# Patient Record
Sex: FEMALE | Race: WHITE | HISPANIC OR LATINO | URBAN - METROPOLITAN AREA
[De-identification: names, ages, dates, MRNs, and addresses within clinical notes are randomized per-mention and may not be internally consistent; named-entity substitution may affect disease eponyms.]

---

## 2022-12-17 ENCOUNTER — EMERGENCY (EMERGENCY)
Facility: HOSPITAL | Age: 62
LOS: 1 days | Discharge: HOME | End: 2022-12-17
Admitting: EMERGENCY MEDICINE

## 2022-12-17 VITALS
OXYGEN SATURATION: 96 % | RESPIRATION RATE: 16 BRPM | HEART RATE: 76 BPM | DIASTOLIC BLOOD PRESSURE: 78 MMHG | TEMPERATURE: 98 F | SYSTOLIC BLOOD PRESSURE: 131 MMHG

## 2022-12-17 VITALS
DIASTOLIC BLOOD PRESSURE: 73 MMHG | RESPIRATION RATE: 16 BRPM | SYSTOLIC BLOOD PRESSURE: 113 MMHG | HEIGHT: 65 IN | TEMPERATURE: 97 F | OXYGEN SATURATION: 97 % | HEART RATE: 81 BPM | WEIGHT: 130.07 LBS

## 2022-12-17 DIAGNOSIS — Z87.19 PERSONAL HISTORY OF OTHER DISEASES OF THE DIGESTIVE SYSTEM: ICD-10-CM

## 2022-12-17 DIAGNOSIS — R10.13 EPIGASTRIC PAIN: ICD-10-CM

## 2022-12-17 DIAGNOSIS — K80.20 CALCULUS OF GALLBLADDER WITHOUT CHOLECYSTITIS WITHOUT OBSTRUCTION: ICD-10-CM

## 2022-12-17 DIAGNOSIS — K29.70 GASTRITIS, UNSPECIFIED, WITHOUT BLEEDING: ICD-10-CM

## 2022-12-17 LAB
ALBUMIN SERPL ELPH-MCNC: 3.8 G/DL — SIGNIFICANT CHANGE UP (ref 3.4–5)
ALP SERPL-CCNC: 101 U/L — SIGNIFICANT CHANGE UP (ref 40–120)
ALT FLD-CCNC: 61 U/L — HIGH (ref 12–42)
ANION GAP SERPL CALC-SCNC: 6 MMOL/L — LOW (ref 9–16)
APPEARANCE UR: CLEAR — SIGNIFICANT CHANGE UP
AST SERPL-CCNC: 61 U/L — HIGH (ref 15–37)
BACTERIA # UR AUTO: PRESENT /HPF
BASOPHILS # BLD AUTO: 0.06 K/UL — SIGNIFICANT CHANGE UP (ref 0–0.2)
BASOPHILS NFR BLD AUTO: 0.5 % — SIGNIFICANT CHANGE UP (ref 0–2)
BILIRUB SERPL-MCNC: 1.3 MG/DL — HIGH (ref 0.2–1.2)
BILIRUB UR-MCNC: NEGATIVE — SIGNIFICANT CHANGE UP
BUN SERPL-MCNC: 17 MG/DL — SIGNIFICANT CHANGE UP (ref 7–23)
CALCIUM SERPL-MCNC: 9.5 MG/DL — SIGNIFICANT CHANGE UP (ref 8.5–10.5)
CHLORIDE SERPL-SCNC: 105 MMOL/L — SIGNIFICANT CHANGE UP (ref 96–108)
CO2 SERPL-SCNC: 30 MMOL/L — SIGNIFICANT CHANGE UP (ref 22–31)
COLOR SPEC: YELLOW — SIGNIFICANT CHANGE UP
CREAT SERPL-MCNC: 0.66 MG/DL — SIGNIFICANT CHANGE UP (ref 0.5–1.3)
DIFF PNL FLD: ABNORMAL
EGFR: 100 ML/MIN/1.73M2 — SIGNIFICANT CHANGE UP
EOSINOPHIL # BLD AUTO: 0.1 K/UL — SIGNIFICANT CHANGE UP (ref 0–0.5)
EOSINOPHIL NFR BLD AUTO: 0.8 % — SIGNIFICANT CHANGE UP (ref 0–6)
EPI CELLS # UR: ABNORMAL /HPF (ref 0–5)
GLUCOSE SERPL-MCNC: 140 MG/DL — HIGH (ref 70–99)
GLUCOSE UR QL: NEGATIVE — SIGNIFICANT CHANGE UP
GRAN CASTS # UR COMP ASSIST: ABNORMAL /LPF
HCT VFR BLD CALC: 40.1 % — SIGNIFICANT CHANGE UP (ref 34.5–45)
HGB BLD-MCNC: 13.4 G/DL — SIGNIFICANT CHANGE UP (ref 11.5–15.5)
IMM GRANULOCYTES NFR BLD AUTO: 0.3 % — SIGNIFICANT CHANGE UP (ref 0–0.9)
KETONES UR-MCNC: NEGATIVE — SIGNIFICANT CHANGE UP
LEUKOCYTE ESTERASE UR-ACNC: NEGATIVE — SIGNIFICANT CHANGE UP
LIDOCAIN IGE QN: 119 U/L — SIGNIFICANT CHANGE UP (ref 73–393)
LYMPHOCYTES # BLD AUTO: 17.8 % — SIGNIFICANT CHANGE UP (ref 13–44)
LYMPHOCYTES # BLD AUTO: 2.1 K/UL — SIGNIFICANT CHANGE UP (ref 1–3.3)
MAGNESIUM SERPL-MCNC: 2.3 MG/DL — SIGNIFICANT CHANGE UP (ref 1.6–2.6)
MCHC RBC-ENTMCNC: 31.7 PG — SIGNIFICANT CHANGE UP (ref 27–34)
MCHC RBC-ENTMCNC: 33.4 GM/DL — SIGNIFICANT CHANGE UP (ref 32–36)
MCV RBC AUTO: 94.8 FL — SIGNIFICANT CHANGE UP (ref 80–100)
MONOCYTES # BLD AUTO: 0.46 K/UL — SIGNIFICANT CHANGE UP (ref 0–0.9)
MONOCYTES NFR BLD AUTO: 3.9 % — SIGNIFICANT CHANGE UP (ref 2–14)
NEUTROPHILS # BLD AUTO: 9.05 K/UL — HIGH (ref 1.8–7.4)
NEUTROPHILS NFR BLD AUTO: 76.7 % — SIGNIFICANT CHANGE UP (ref 43–77)
NITRITE UR-MCNC: NEGATIVE — SIGNIFICANT CHANGE UP
NRBC # BLD: 0 /100 WBCS — SIGNIFICANT CHANGE UP (ref 0–0)
PH UR: 6 — SIGNIFICANT CHANGE UP (ref 5–8)
PLATELET # BLD AUTO: 279 K/UL — SIGNIFICANT CHANGE UP (ref 150–400)
POTASSIUM SERPL-MCNC: 4.2 MMOL/L — SIGNIFICANT CHANGE UP (ref 3.5–5.3)
POTASSIUM SERPL-SCNC: 4.2 MMOL/L — SIGNIFICANT CHANGE UP (ref 3.5–5.3)
PROT SERPL-MCNC: 7.7 G/DL — SIGNIFICANT CHANGE UP (ref 6.4–8.2)
PROT UR-MCNC: 30 MG/DL
RBC # BLD: 4.23 M/UL — SIGNIFICANT CHANGE UP (ref 3.8–5.2)
RBC # FLD: 11.9 % — SIGNIFICANT CHANGE UP (ref 10.3–14.5)
RBC CASTS # UR COMP ASSIST: ABNORMAL /HPF
SODIUM SERPL-SCNC: 141 MMOL/L — SIGNIFICANT CHANGE UP (ref 132–145)
SP GR SPEC: 1.02 — SIGNIFICANT CHANGE UP (ref 1–1.03)
TROPONIN I, HIGH SENSITIVITY RESULT: 4.7 NG/L — SIGNIFICANT CHANGE UP
UROBILINOGEN FLD QL: 0.2 E.U./DL — SIGNIFICANT CHANGE UP
WBC # BLD: 11.81 K/UL — HIGH (ref 3.8–10.5)
WBC # FLD AUTO: 11.81 K/UL — HIGH (ref 3.8–10.5)
WBC UR QL: < 5 /HPF — SIGNIFICANT CHANGE UP

## 2022-12-17 PROCEDURE — 99284 EMERGENCY DEPT VISIT MOD MDM: CPT

## 2022-12-17 PROCEDURE — 76705 ECHO EXAM OF ABDOMEN: CPT | Mod: 26

## 2022-12-17 PROCEDURE — 71046 X-RAY EXAM CHEST 2 VIEWS: CPT | Mod: 26

## 2022-12-17 RX ORDER — FAMOTIDINE 10 MG/ML
20 INJECTION INTRAVENOUS ONCE
Refills: 0 | Status: COMPLETED | OUTPATIENT
Start: 2022-12-17 | End: 2022-12-17

## 2022-12-17 RX ORDER — FAMOTIDINE 10 MG/ML
1 INJECTION INTRAVENOUS
Qty: 60 | Refills: 0
Start: 2022-12-17 | End: 2023-01-15

## 2022-12-17 RX ORDER — ONDANSETRON 8 MG/1
4 TABLET, FILM COATED ORAL ONCE
Refills: 0 | Status: COMPLETED | OUTPATIENT
Start: 2022-12-17 | End: 2022-12-17

## 2022-12-17 RX ADMIN — FAMOTIDINE 20 MILLIGRAM(S): 10 INJECTION INTRAVENOUS at 11:02

## 2022-12-17 RX ADMIN — Medication 30 MILLILITER(S): at 11:02

## 2022-12-17 RX ADMIN — ONDANSETRON 4 MILLIGRAM(S): 8 TABLET, FILM COATED ORAL at 11:02

## 2022-12-17 NOTE — ED PROVIDER NOTE - OBJECTIVE STATEMENT
61 yo F, denies pmhx, presenting to the ED w/ epigastric abd pain that began suddenly while working today. +nausea w/o vomiting. Non radiating and no associated chest pain.    [id number] 61 yo F, h/o gastritis, presenting to the ED w/ epigastric abd pain that began suddenly while working today. +nausea w/o vomiting. Non radiating and no associated chest pain. Pt had coffee and a fried egg this AM. She reports similar pain in the past 2/2 gastritis. Denies fevers, chills, headaches, dizziness, chest pain, SOB, back pain, vomiting, diarrhea, or urinary sxs.  PI: [id number]

## 2022-12-17 NOTE — ED PROVIDER NOTE - PROGRESS NOTE DETAILS
Unable to visualize gallbladder during bedside US. Mild transaminitis on labs, will obtain official US. Patient reports resolution of symptoms. Likely gastritis. Official US shows gallstones w/o wall inflammation. WIll refer to surgery. RX for pepcid sent. Pt stable on DC.

## 2022-12-17 NOTE — ED PROVIDER NOTE - PHYSICAL EXAMINATION
VITAL SIGNS: I have reviewed nursing notes and confirm.  CONSTITUTIONAL: Well-developed; well-nourished; in no acute distress.  SKIN: Skin is warm and dry, no acute rash.  HEAD: Normocephalic; atraumatic.  NECK: Supple; non tender.  CARD: S1, S2 normal; no murmurs, gallops, or rubs. Regular rate and rhythm.  RESP: No wheezes, rales or rhonchi.  ABD: Soft; non-distended; +ttp along the epigastric region; no rebound or guarding.  EXT: Normal ROM. No clubbing, cyanosis or edema.  NEURO: Alert, oriented. Grossly unremarkable. BOLANOS, normal tone, no gross motor or sensory changes. Fluent speech.   PSYCH: Cooperative, appropriate. Mood and affect wnl.

## 2022-12-17 NOTE — ED PROVIDER NOTE - CARE PROVIDER_API CALL
Adriana Corea)  Surgery  [address], [place] [address]  Phone: [phone]  Fax: [phone]  Follow Up Time: 1-3 Days

## 2022-12-17 NOTE — ED PROVIDER NOTE - PATIENT PORTAL LINK FT
You can access the FollowMyHealth Patient Portal offered by [facility] by registering at the following website: [email or web address]. By joining [employer]’s FollowMyHealth portal, you will also be able to view your health information using other applications (apps) compatible with our system.

## 2022-12-17 NOTE — ED PROVIDER NOTE - CARE PROVIDERS DIRECT ADDRESSES
[email or web address]

## 2022-12-17 NOTE — ED PROVIDER NOTE - NS ED ROS FT
+nausea and abdominal pain  Denies fevers, chills, vomiting, diarrhea, constipation, urinary symptoms, chest pain, palpitations, shortness of breath, dyspnea on exertion, syncope/near syncope, cough/URI symptoms, headache, weakness, numbness, focal deficits, visual changes, gait or balance changes, dizziness

## 2022-12-17 NOTE — ED PROVIDER NOTE - CONDITION AT DISCHARGE:
Satisfactory

## 2022-12-17 NOTE — ED PROVIDER NOTE - CLINICAL SUMMARY MEDICAL DECISION MAKING FREE TEXT BOX
63 yo F, h/o gastritis, presenting to the ED w/ epigastric abd pain that began suddenly while working today. Patient found to have epigastric ttp on exam. Will plan to obtain EKG, CXR, and medical labs. Will give GI cocktail, PO challenge and reassess. Likely GI outpt f/u. Dispo pending clinical improvement. 63 yo F, h/o gastritis, presenting to the ED w/ epigastric abd pain that began suddenly while working today. Patient found to have epigastric ttp on exam. Bedside US does not show obvious signs of stones. Will plan to obtain EKG, CXR, and medical labs. Will give GI cocktail, PO challenge and reassess. Likely GI outpt f/u. Dispo pending clinical improvement. 61 yo F, h/o gastritis, presenting to the ED w/ epigastric abd pain that began suddenly while working today. Patient found to have epigastric ttp on exam. Will plan to obtain EKG, CXR, and medical labs. Will give GI cocktail, PO challenge and reassess. Likely GI outpt f/u. Dispo pending clinical improvement.

## 2022-12-17 NOTE — ED PROVIDER NOTE - NSFOLLOWUPINSTRUCTIONS_ED_ALL_ED_FT
Gastritis    Gastritis is soreness and swelling (inflammation) of the lining of the stomach. Gastritis can develop as a sudden onset (acute) or long-term (chronic) condition. If gastritis is not treated, it can lead to stomach bleeding and ulcers. Causes include viral and bacterial infections, excessive alcohol consumption, tobacco use, or certain medications. Symptoms include nausea, vomiting, or abdominal pain or burning especially after eating. Avoid foods or drinks that make your symptoms worse such as caffeine, chocolate, spicy foods, acidic foods, or alcohol.    SEEK IMMEDIATE MEDICAL CARE IF YOU HAVE ANY OF THE FOLLOWING SYMPTOMS: black or bloody stools, blood or coffee-ground-colored vomitus, worsening abdominal pain, fever, or inability to keep fluids down.        Colelitiasis    Cholelithiasis       La colelitiasis es wendy enfermedad en la que se neda cálculos biliares en la vesícula biliar. La vesícula biliar es un órgano que almacena bilis. La bilis es un líquido que interviene en la digestión de las grasas. Los cálculos comienzan trino pequeños jacqui y lentamente pueden transformarse en piedras. Es posible que no causen síntomas hasta que obstruyen el conducto de la vesícula biliar o el conducto quístico, cuando la vesícula biliar se tensa (contrae) después de comer alimentos. [place] puede causar dolor y se conoce trino ataque de vesícula biliar o cólico biliar.    Hay dos tipos principales de cálculos biliares:  •Cálculos de colesterol. Estos son el tipo de cálculo biliar más frecuente. Estos cálculos se neda por el colesterol endurecido y generalmente son de color amarillo verdoso. El colesterol es wendy sustancia parecida a la grasa que se forma en el hígado.      •Cálculos de pigmento. Estos son de color oscuro y están formados por wendy sustancia amarillo rojiza, llamada bilirrubina,que se forma cuando la hemoglobina de los glóbulos rojos se descompone.        ¿Cuáles son las causas?    La causa de esta afección puede ser un desequilibrio en las diferentes partes que producen la bilis. [place] puede suceder si la bilis:  •Tiene mucha bilirrubina. [place] puede suceder en ciertas enfermedades de la lissy, trino la anemia drepanocítica.      •Tiene mucho colesterol.      •No tiene suficiente sales biliares. Estas sales le ayudan al organismo a absorber y a digerir las grasas.      En ciertos casos, esta afección también puede ser causada por wendy vesícula biliar que no se vacía completamente o lo suficientemente seguido. [place] es frecuente acacia el embarazo.      ¿Qué incrementa el riesgo?    Los siguientes factores pueden hacer que sea más propenso a contraer esta afección:  •Ser leelee.      •Tener embarazos múltiples. Algunas veces los médicos aconsejan extirpar los cálculos biliares antes de futuros embarazos.      •Tener wendy dieta con demasiadas comidas fritas, grasas y carbohidratos refinados, trino el pan avila y el arroz avila.      •Ser mariya.      •Ser mayor de 40 años de edad.      •Usar medicamentos que contienen hormonas femeninas (estrógenos) acacia un pollo tiempo.      •Perder peso con rapidez.      •Antecedentes familiares de cálculos biliares.    •Tener ciertos problemas médicos, por ejemplo:  •Diabetes mellitus.      •Fibrosis quística.      •Enfermedad de Crohn.      •Cirrosis u otra enfermedad hepática a pollo plazo (crónica).      •Determinadas enfermedades de la lissy, trino anemia drepanocítica o leucemia.          ¿Cuáles son los signos o síntomas?    En muchos casos, tener cálculos biliares no causa síntomas. Si tiene cálculos biliares kelsey no tiene síntomas, tiene cálculos silenciosos. Si un cálculo biliar bloquea el conducto biliar, puede causar un ataque de vesícula biliar. El principal síntoma de un ataque de vesícula biliar es un dolor repentino en la parte superior derecha del abdomen. El dolor:  •Aparece generalmente a la noche o después de comer comidas abundantes.       •Puede durar wendy hora o más.      •Se puede extender hacia el hombro derecho, la espalda o el pecho.      •Puede sentirse trino indigestión. [place] es wendy molestia, ardor o sensación de plenitud en la parte superior del abdomen.      Si el conducto biliar está bloqueado acacia más de algunas horas, puede causar infección o inflamación de la vesícula biliar (colecistitis), del hígado o del páncreas. [place] puede causar lo siguiente:  •Náuseas o vómitos.      •Distensión.      •Dolor abdominal que dura 5 horas o más.      •Dolor con la palpación en la parte superior del abdomen, a menudo en la sección superior derecha y debajo de la caja torácica.      •Fiebre o escalofríos.      •La piel o las partes harvey de los ojos se ponen andi (ictericia). En general esto ocurre cuando wendy juanjose ha obstruido el paso de la bilis a través del conducto biliar común.      •Orina de color oscuro o heces de color pálido.        ¿Cómo se diagnostica?    Esta afección se puede diagnosticar en función de lo siguiente:  •Un examen físico.      •Anastacia antecedentes médicos.      •Wendy ecografía.      •Exploración por tomografía computarizada (TC).      •Resonancia magnética (RM).      También pueden hacerle otras pruebas, incluidas las siguientes:  •Análisis de lissy para detectar signos de infección o inflamación.      •Wendy colecistografía o gammagrafía hepatobiliar HIDA. Francoise es un estudio de la vesícula biliar y los conductos biliares (sistema biliar) que utiliza material radiactivo no dañino y cámaras especiales que pueden rolo el material radiactivo.      •Colangiopancreatografía retrógrada endoscópica. Francoise estudio consiste en insertar un pequeño tubo con wendy cámara en el extremo (endoscopio) a través de la boca para observar los conductos biliares y detectar obstrucciones.        ¿Cómo se trata?    El tratamiento de esta afección depende de la gravedad. Los cálculos silenciosos no requieren tratamiento. Si wendy obstrucción causa un ataque de vesícula biliar u otros síntomas, puede ser necesario recibir tratamiento. El tratamiento puede incluir:•Cuidados en el hogar, si los síntomas no son graves.  •Acacia un ataque simple de vesícula biliar, deje de comer y beber acacia 12 a 24 horas (excepto agua y líquidos transparentes). [place] ayuda a “enfriar” la vesícula biliar. Después de 1 o 2 días, puede comenzar a consumir alimentos simples o transparentes, trino caldos y galletas.      •También es posible que necesite medicamentos para el dolor o las náuseas, o para ambos.       •Si tiene colecistitis y wendy infección, necesitará antibióticos.        •Hospitalización, si es necesaria para controlar del dolor o en dante de colecistitis con infección grave.      •Wendy colecistectomía, o cirugía para extirpar la vesícula biliar. Francoise es el tratamiento más frecuente si todos los demás tratamientos no saravia funcionado.      •Medicamentos para destruir los cálculos biliares. Estos son más efectivos para tratar cálculos pequeños. Los medicamentos pueden usarse acacia hasta 6 a 12 meses.      •Colangiopancreatografía retrógrada endoscópica. Se puede anexar wendy pequeña cesta al endoscopio y usarse para capturar y eliminar los cálculos, especialmente los que se encuentran en el conducto biliar común.        Siga estas instrucciones en mcgrath casa:    Medicamentos     •Use los medicamentos de venta carisa y los recetados solamente trino se lo haya indicado el médico.      •Si le recetaron un antibiótico, tómelo trino se lo haya indicado el médico. No deje de emmanuel el antibiótico aunque comience a sentirse mejor.      •Pregúntele al médico si el medicamento recetado le impide conducir o usar maquinaria.      Comida y bebida     •Karen suficiente líquido trino para mantener la orina de color amarillo pálido. [place] es importante acacia un ataque de vesícula biliar. Es preferible emmanuel agua y líquidos abiola.    •Siga wendy dieta saludable. [place] puede comprender lo siguiente:  •Reducir los alimentos grasos, trino los alimentos fritos y los alimentos con alto contenido de colesterol.      •Reducir los carbohidratos refinados, trino el pan avila y el arroz avila.      •Consumir más fibra. Alimentarse con alimentos trino almendras, frutas y frijoles.

## 2022-12-19 PROBLEM — Z00.00 ENCOUNTER FOR PREVENTIVE HEALTH EXAMINATION: Status: ACTIVE | Noted: 2022-12-19

## 2022-12-19 PROBLEM — K29.70 GASTRITIS, UNSPECIFIED, WITHOUT BLEEDING: Chronic | Status: ACTIVE | Noted: 2022-12-17

## 2022-12-27 ENCOUNTER — APPOINTMENT (OUTPATIENT)
Dept: SURGERY | Facility: CLINIC | Age: 62
End: 2022-12-27

## 2022-12-27 VITALS
SYSTOLIC BLOOD PRESSURE: 121 MMHG | WEIGHT: 143 LBS | OXYGEN SATURATION: 98 % | BODY MASS INDEX: 28.83 KG/M2 | HEIGHT: 59 IN | DIASTOLIC BLOOD PRESSURE: 75 MMHG | HEART RATE: 75 BPM | TEMPERATURE: 98.2 F | RESPIRATION RATE: 16 BRPM

## 2022-12-27 DIAGNOSIS — Z87.19 PERSONAL HISTORY OF OTHER DISEASES OF THE DIGESTIVE SYSTEM: ICD-10-CM

## 2022-12-27 DIAGNOSIS — Z78.9 OTHER SPECIFIED HEALTH STATUS: ICD-10-CM

## 2022-12-27 PROCEDURE — 99244 OFF/OP CNSLTJ NEW/EST MOD 40: CPT

## 2022-12-27 RX ORDER — FAMOTIDINE 20 MG/1
20 TABLET, FILM COATED ORAL
Refills: 0 | Status: ACTIVE | COMMUNITY

## 2022-12-27 NOTE — REASON FOR VISIT
[Consultation] : a consultation visit [FreeTextEntry1] : Consultation requested by: Dr. Quintin Rodney [Interpreters_IDNumber] : [id number] [Interpreters_FullName] : JESSICA [TWNoteComboBox1] : Tongan

## 2022-12-27 NOTE — PHYSICAL EXAM
[Calm] : calm [de-identified] : NAD, comfortable [de-identified] : NCAT, no scleral icterus [de-identified] : +BS soft ND.  Mild epigastric tenderness without rebound or guarding.  Negative Diaz's sign.  No hepatosplenomegaly. [de-identified] : No clubbing, cyanosis, or edema.  [de-identified] : Warm, dry.  [de-identified] : A&Ox3

## 2022-12-27 NOTE — ASSESSMENT
[FreeTextEntry1] : Ms. Jonathan Salgado is a 62-year-old woman with epigastric pain and cholelithiasis.  It is unclear whether her symptoms are related to her known history of gastritis (or some other peptic ulcer disease issue) or the cholelithiasis.  She was given a referral for an endoscopy to evaluate the stomach prior to cholecystectomy.

## 2022-12-27 NOTE — DATA REVIEWED
[FreeTextEntry1] : US abdomen (12/17/2022) - normal caliber common bile duct measures 0.5 cm.  Numerous gallstones. No evidence of cholecystitis.  Hepatic steatosis. None

## 2022-12-27 NOTE — CONSULT LETTER
[FreeTextEntry1] : 2022\par \par \par \par \par Quintin Rodney M.D.\par [address] [facility]\[place] [address]\par Telephone #: [phone]\par \par \par Re: Jonathan Salgado, Leifitiva\par : 1960\par \par \par Dear Dr. Rodney:\par \par I had the opportunity to see Ms. Jonathan Salgado today for evaluation and management of epigastric pain.  She had sudden onset of the pain 10 days ago, associated with nausea and sweating.  She denied associated vomiting, fevers, or chills.  She was taken by ambulance to the emergency department on 2022, and diagnosed with gallstones.  She stated the episode of pain did not have any association with food intake.  Since discharge from the emergency department, she has not had any further episodes of pain.  She stated five years ago, she had similar pain and was told to change her diet to avoid spicy foods, with subsequent resolution of the pain.\par \par On physical examination, her height is 4 feet 11 inches, weight is 143 pounds, and BMI 28.88. Her temperature is 98.2 °F, blood pressure is 121/75, heart rate is 75, and O2 saturation is 98% on room air. In general, she is a well-dressed, well-nourished woman who appears her stated age and is in no acute distress. She is calm, alert and oriented x 3. HEENT exam demonstrates a normocephalic atraumatic appearance with no scleral icterus. Her abdomen is soft and non-distended.  There is mild epigastric tenderness.  Diaz's sign is negative.  Her extremities are warm and dry without clubbing, cyanosis or edema. \par \par I reviewed the report of the ultrasound of the abdomen that was performed on 2022, which demonstrated normal caliber common bile duct measures 0.5 cm.  Numerous gallstones. No evidence of cholecystitis.  Hepatic steatosis.\par \par In summary, Ms. Jonathan Salgado is a 62-year-old woman with epigastric pain and cholelithiasis.  It is unclear whether her symptoms are related to her known history of gastritis (or some other peptic ulcer disease issue) or the cholelithiasis.  She was given a referral for an endoscopy to evaluate the stomach prior to cholecystectomy.\par \par Thank you for the opportunity to care for this patient. Please do not hesitate to contact me in the event that you have any questions or concerns regarding the care of this patient. \par \par Sincerely,\par \par \par \par \par Adriana Corea M.D.

## 2022-12-27 NOTE — HISTORY OF PRESENT ILLNESS
[de-identified] : Ms. Jonathan Salgado presented today for evaluation and management of epigastric pain.  She had sudden onset of the pain 10 days ago, associated with nausea and sweating.  She denied associated vomiting, fevers, or chills.  She was taken by ambulance to the emergency department on December 17, 2022, and diagnosed with gallstones.  She stated the episode of pain did not have any association with food intake.  Since discharge from the emergency department, she has not had any further episodes of pain.  She stated five years ago, she had similar pain and was told to change her diet to avoid spicy foods, with subsequent resolution of the pain.

## 2023-01-27 ENCOUNTER — APPOINTMENT (OUTPATIENT)
Dept: GASTROENTEROLOGY | Facility: CLINIC | Age: 63
End: 2023-01-27
Payer: MEDICAID

## 2023-01-27 VITALS
OXYGEN SATURATION: 98 % | TEMPERATURE: 97.4 F | SYSTOLIC BLOOD PRESSURE: 125 MMHG | DIASTOLIC BLOOD PRESSURE: 60 MMHG | HEART RATE: 87 BPM | WEIGHT: 144 LBS | HEIGHT: 59 IN | RESPIRATION RATE: 15 BRPM | BODY MASS INDEX: 29.03 KG/M2

## 2023-01-27 DIAGNOSIS — Z01.812 ENCOUNTER FOR PREPROCEDURAL LABORATORY EXAMINATION: ICD-10-CM

## 2023-01-27 DIAGNOSIS — E78.2 MIXED HYPERLIPIDEMIA: ICD-10-CM

## 2023-01-27 DIAGNOSIS — R12 HEARTBURN: ICD-10-CM

## 2023-01-27 PROCEDURE — 99204 OFFICE O/P NEW MOD 45 MIN: CPT

## 2023-01-27 RX ORDER — OMEPRAZOLE 40 MG/1
40 CAPSULE, DELAYED RELEASE ORAL
Qty: 30 | Refills: 1 | Status: ACTIVE | COMMUNITY
Start: 2023-01-27 | End: 1900-01-01

## 2023-01-27 NOTE — PHYSICAL EXAM
[Alert] : alert [No Respiratory Distress] : no respiratory distress [Respiration, Rhythm And Depth] : normal respiratory rhythm and effort [Auscultation Breath Sounds / Voice Sounds] : lungs were clear to auscultation bilaterally [Heart Rate And Rhythm] : heart rate was normal and rhythm regular [Abdomen Soft] : soft [Oriented To Time, Place, And Person] : oriented to person, place, and time [de-identified] : moderate tenderness to palpation in epigastric region

## 2023-01-27 NOTE — ASSESSMENT
[FreeTextEntry1] : Patient is a 62 year old Indonesian speaking female with a history of cholelithiasis and gastritis who presents due to referral from Dr. Adriana Corea for evaluation due to epigastric pain.\par \par Patient to start omeprazole 40 mg once a day and famotidine 40 mg at night.  GERD precautions reviewed.  \par \par Patient to undergo upper endoscopy at [facility].  R/B/C of procedure discussed with patient.  COVID testing and escort for the procedure also reviewed.\par \par \par Sheree CHING; PA, am scribing for and in the presence of Dr. Chema Jefferson the following sections: history of present illness, past medical/family/social history; review of systems; vital signs; physical exam; disposition.\par \par Chema CHING MD, personally performed the services described in the documentation, reviewed the documentation recorded by the scribe in my presence and it accurately and completely records my words and actions.	\par \par \par \par \par

## 2023-01-27 NOTE — HISTORY OF PRESENT ILLNESS
[FreeTextEntry1] : Patient is a 62 year old American speaking female with a history of cholelithiasis and gastritis who presents due to referral from Dr. Adriana Corea for evaluation of epigastric pain.  Patient states that she has been experiencing intermittent upper abdominal pain for 4 years.  Patient states that the discomfort feels like a dull pressure in the epigastric region and is a 4 to 5 on the pain scale.  She also reports heartburn. Patient states that the pain worsened in December 2022 and she was taken to the ER.  At the ER, patient underwent lab evaluation that showed bilirubin of 1.3, AST of 61 and ALT of 61.  Patient underwent US of abdomen that showed  hepatic steatosis, numerous gallstones; no evidence of cholecystitis.  Patient was discharged on famotidine 40 mg.  Since discharge, patient was seen by Dr. Corea for a surgical consult for cholecystectomy.  Patient states that her last upper endoscopy and colonoscopy were 3 years ago.  She states that her upper endoscopy was positive for gastritis.  Her colonoscopy was negative for polyps.  Patient is currently following a low fat, low acidic diet; stopped coffee, spicy foods and tomatoes.  Patient denies nausea, vomiting, changes in bowel habits, dark stool and BRBPR. \par \par Patient denies family history of colon, gastric and pancreatic cancer.\par \par Patient obtained the Pfizer COVID-19 vaccinations and booster x 1.

## 2023-01-30 ENCOUNTER — APPOINTMENT (OUTPATIENT)
Dept: GASTROENTEROLOGY | Facility: HOSPITAL | Age: 63
End: 2023-01-30

## 2023-01-30 ENCOUNTER — RESULT REVIEW (OUTPATIENT)
Age: 63
End: 2023-01-30

## 2023-01-30 ENCOUNTER — OUTPATIENT (OUTPATIENT)
Dept: OUTPATIENT SERVICES | Facility: HOSPITAL | Age: 63
LOS: 1 days | Discharge: HOME | End: 2023-01-30
Payer: COMMERCIAL

## 2023-01-30 VITALS — HEIGHT: 72 IN | WEIGHT: 145.06 LBS

## 2023-01-30 LAB — SARS-COV-2 N GENE NPH QL NAA+PROBE: NOT DETECTED

## 2023-01-30 PROCEDURE — 43239 EGD BIOPSY SINGLE/MULTIPLE: CPT

## 2023-01-30 PROCEDURE — 88305 TISSUE EXAM BY PATHOLOGIST: CPT | Mod: 26

## 2023-01-30 PROCEDURE — 43239 EGD BIOPSY SINGLE/MULTIPLE: CPT | Mod: GC

## 2023-01-30 PROCEDURE — 88305 TISSUE EXAM BY PATHOLOGIST: CPT

## 2023-01-30 NOTE — PRE-ANESTHESIA EVALUATION ADULT - NSANTHOSAYNRD_GEN_A_CORE
No. SENG screening performed.  STOP BANG Legend: 0-2 = LOW Risk; 3-4 = INTERMEDIATE Risk; 5-8 = HIGH Risk

## 2023-01-31 LAB — SURGICAL PATHOLOGY STUDY: SIGNIFICANT CHANGE UP

## 2023-04-21 ENCOUNTER — APPOINTMENT (OUTPATIENT)
Dept: GASTROENTEROLOGY | Facility: CLINIC | Age: 63
End: 2023-04-21
Payer: MEDICAID

## 2023-04-21 VITALS
WEIGHT: 151 LBS | RESPIRATION RATE: 16 BRPM | HEIGHT: 60 IN | DIASTOLIC BLOOD PRESSURE: 79 MMHG | OXYGEN SATURATION: 97 % | TEMPERATURE: 97.9 F | BODY MASS INDEX: 29.64 KG/M2 | SYSTOLIC BLOOD PRESSURE: 134 MMHG | HEART RATE: 88 BPM

## 2023-04-21 DIAGNOSIS — K31.A0 GASTRIC INTESTINAL METAPLASIA, UNSPECIFIED: ICD-10-CM

## 2023-04-21 DIAGNOSIS — K80.20 CALCULUS OF GALLBLADDER W/OUT CHOLECYSTITIS W/OUT OBSTRUCTION: ICD-10-CM

## 2023-04-21 DIAGNOSIS — R10.13 EPIGASTRIC PAIN: ICD-10-CM

## 2023-04-21 PROCEDURE — 99214 OFFICE O/P EST MOD 30 MIN: CPT

## 2023-04-21 RX ORDER — OMEPRAZOLE 40 MG/1
40 CAPSULE, DELAYED RELEASE ORAL
Qty: 30 | Refills: 11 | Status: ACTIVE | COMMUNITY
Start: 2023-04-21 | End: 1900-01-01

## 2023-04-21 NOTE — ASSESSMENT
[FreeTextEntry1] : 61 yo female with hx abdominal pain, cholelithiasis as well as IM of the GE junction\par \par - Continue omeprazole 40 mg PO daily\par - Will repeat an EGD in January 2024 and hav pt f/u in office afterwards \par - Will refer back to Dr. Corea for possible cholecystectomy\par \par \par

## 2023-04-21 NOTE — ASSESSMENT
[FreeTextEntry1] : 63 yo female with hx abdominal pain, cholelithiasis as well as IM of the GE junction\par \par - Continue omeprazole 40 mg PO daily\par - Will repeat an EGD in January 2024 and hav pt f/u in office afterwards \par - Will refer back to Dr. Corea for possible cholecystectomy\par \par \par

## 2023-04-21 NOTE — HISTORY OF PRESENT ILLNESS
[de-identified] : 1/30/2023\par Small hiatal hernia, irregularity GE junction, non-erosive gastritis, duodenitis (bx --> EG junction mucosa with IM).   [FreeTextEntry1] : 3 years ago -- normal, no polyps -- in [place]  [de-identified] : 12/17/2022 US of abdomen that showed hepatic steatosis, numerous gallstones; no evidence of cholecystitis.

## 2023-04-21 NOTE — HISTORY OF PRESENT ILLNESS
[de-identified] : 1/30/2023\par Small hiatal hernia, irregularity GE junction, non-erosive gastritis, duodenitis (bx --> EG junction mucosa with IM).   [FreeTextEntry1] : 3 years ago -- normal, no polyps -- in [place]  [de-identified] : 12/17/2022 US of abdomen that showed hepatic steatosis, numerous gallstones; no evidence of cholecystitis.

## 2023-08-02 ENCOUNTER — INPATIENT (INPATIENT)
Facility: HOSPITAL | Age: 63
LOS: 1 days | Discharge: HOME | DRG: 418 | End: 2023-08-04
Attending: SURGERY | Admitting: SURGERY
Payer: COMMERCIAL

## 2023-08-02 VITALS
OXYGEN SATURATION: 97 % | WEIGHT: 147.05 LBS | TEMPERATURE: 98 F | RESPIRATION RATE: 16 BRPM | HEART RATE: 77 BPM | SYSTOLIC BLOOD PRESSURE: 151 MMHG | DIASTOLIC BLOOD PRESSURE: 76 MMHG

## 2023-08-02 LAB
ALBUMIN SERPL ELPH-MCNC: 4.3 G/DL — SIGNIFICANT CHANGE UP (ref 3.3–5)
ALP SERPL-CCNC: 158 U/L — HIGH (ref 40–120)
ALT FLD-CCNC: 297 U/L — HIGH (ref 10–45)
ANION GAP SERPL CALC-SCNC: 8 MMOL/L — SIGNIFICANT CHANGE UP (ref 5–17)
APPEARANCE UR: CLEAR — SIGNIFICANT CHANGE UP
AST SERPL-CCNC: 85 U/L — HIGH (ref 10–40)
BACTERIA # UR AUTO: PRESENT /HPF
BASOPHILS # BLD AUTO: 0.04 K/UL — SIGNIFICANT CHANGE UP (ref 0–0.2)
BASOPHILS NFR BLD AUTO: 0.5 % — SIGNIFICANT CHANGE UP (ref 0–2)
BILIRUB SERPL-MCNC: 1 MG/DL — SIGNIFICANT CHANGE UP (ref 0.2–1.2)
BILIRUB UR-MCNC: NEGATIVE — SIGNIFICANT CHANGE UP
BUN SERPL-MCNC: 14 MG/DL — SIGNIFICANT CHANGE UP (ref 7–23)
CALCIUM SERPL-MCNC: 9.2 MG/DL — SIGNIFICANT CHANGE UP (ref 8.4–10.5)
CHLORIDE SERPL-SCNC: 101 MMOL/L — SIGNIFICANT CHANGE UP (ref 96–108)
CO2 SERPL-SCNC: 29 MMOL/L — SIGNIFICANT CHANGE UP (ref 22–31)
COLOR SPEC: YELLOW — SIGNIFICANT CHANGE UP
CREAT SERPL-MCNC: 0.67 MG/DL — SIGNIFICANT CHANGE UP (ref 0.5–1.3)
DIFF PNL FLD: ABNORMAL
EGFR: 100 ML/MIN/1.73M2 — SIGNIFICANT CHANGE UP
EOSINOPHIL # BLD AUTO: 0.12 K/UL — SIGNIFICANT CHANGE UP (ref 0–0.5)
EOSINOPHIL NFR BLD AUTO: 1.4 % — SIGNIFICANT CHANGE UP (ref 0–6)
EPI CELLS # UR: SIGNIFICANT CHANGE UP /HPF (ref 0–5)
GLUCOSE SERPL-MCNC: 99 MG/DL — SIGNIFICANT CHANGE UP (ref 70–99)
GLUCOSE UR QL: NEGATIVE — SIGNIFICANT CHANGE UP
HCT VFR BLD CALC: 38.9 % — SIGNIFICANT CHANGE UP (ref 34.5–45)
HGB BLD-MCNC: 12.8 G/DL — SIGNIFICANT CHANGE UP (ref 11.5–15.5)
IMM GRANULOCYTES NFR BLD AUTO: 0.4 % — SIGNIFICANT CHANGE UP (ref 0–0.9)
KETONES UR-MCNC: NEGATIVE — SIGNIFICANT CHANGE UP
LEUKOCYTE ESTERASE UR-ACNC: NEGATIVE — SIGNIFICANT CHANGE UP
LIDOCAIN IGE QN: 27 U/L — SIGNIFICANT CHANGE UP (ref 7–60)
LYMPHOCYTES # BLD AUTO: 3.41 K/UL — HIGH (ref 1–3.3)
LYMPHOCYTES # BLD AUTO: 40.5 % — SIGNIFICANT CHANGE UP (ref 13–44)
MCHC RBC-ENTMCNC: 31.5 PG — SIGNIFICANT CHANGE UP (ref 27–34)
MCHC RBC-ENTMCNC: 32.9 GM/DL — SIGNIFICANT CHANGE UP (ref 32–36)
MCV RBC AUTO: 95.8 FL — SIGNIFICANT CHANGE UP (ref 80–100)
MONOCYTES # BLD AUTO: 0.4 K/UL — SIGNIFICANT CHANGE UP (ref 0–0.9)
MONOCYTES NFR BLD AUTO: 4.8 % — SIGNIFICANT CHANGE UP (ref 2–14)
NEUTROPHILS # BLD AUTO: 4.42 K/UL — SIGNIFICANT CHANGE UP (ref 1.8–7.4)
NEUTROPHILS NFR BLD AUTO: 52.4 % — SIGNIFICANT CHANGE UP (ref 43–77)
NITRITE UR-MCNC: NEGATIVE — SIGNIFICANT CHANGE UP
NRBC # BLD: 0 /100 WBCS — SIGNIFICANT CHANGE UP (ref 0–0)
PH UR: 5.5 — SIGNIFICANT CHANGE UP (ref 5–8)
PLATELET # BLD AUTO: 285 K/UL — SIGNIFICANT CHANGE UP (ref 150–400)
POTASSIUM SERPL-MCNC: 3.9 MMOL/L — SIGNIFICANT CHANGE UP (ref 3.5–5.3)
POTASSIUM SERPL-SCNC: 3.9 MMOL/L — SIGNIFICANT CHANGE UP (ref 3.5–5.3)
PROT SERPL-MCNC: 7.4 G/DL — SIGNIFICANT CHANGE UP (ref 6–8.3)
PROT UR-MCNC: NEGATIVE MG/DL — SIGNIFICANT CHANGE UP
RBC # BLD: 4.06 M/UL — SIGNIFICANT CHANGE UP (ref 3.8–5.2)
RBC # FLD: 12.3 % — SIGNIFICANT CHANGE UP (ref 10.3–14.5)
RBC CASTS # UR COMP ASSIST: < 5 /HPF — SIGNIFICANT CHANGE UP
SODIUM SERPL-SCNC: 138 MMOL/L — SIGNIFICANT CHANGE UP (ref 135–145)
SP GR SPEC: >=1.03 — SIGNIFICANT CHANGE UP (ref 1–1.03)
UROBILINOGEN FLD QL: 0.2 E.U./DL — SIGNIFICANT CHANGE UP
WBC # BLD: 8.42 K/UL — SIGNIFICANT CHANGE UP (ref 3.8–10.5)
WBC # FLD AUTO: 8.42 K/UL — SIGNIFICANT CHANGE UP (ref 3.8–10.5)
WBC UR QL: < 5 /HPF — SIGNIFICANT CHANGE UP

## 2023-08-02 PROCEDURE — 76705 ECHO EXAM OF ABDOMEN: CPT | Mod: 26

## 2023-08-02 PROCEDURE — 99285 EMERGENCY DEPT VISIT HI MDM: CPT

## 2023-08-02 PROCEDURE — 74176 CT ABD & PELVIS W/O CONTRAST: CPT | Mod: 26

## 2023-08-02 RX ORDER — HYDROMORPHONE HYDROCHLORIDE 2 MG/ML
0.25 INJECTION INTRAMUSCULAR; INTRAVENOUS; SUBCUTANEOUS EVERY 6 HOURS
Refills: 0 | Status: DISCONTINUED | OUTPATIENT
Start: 2023-08-02 | End: 2023-08-03

## 2023-08-02 RX ORDER — ONDANSETRON 8 MG/1
4 TABLET, FILM COATED ORAL EVERY 6 HOURS
Refills: 0 | Status: DISCONTINUED | OUTPATIENT
Start: 2023-08-02 | End: 2023-08-03

## 2023-08-02 RX ORDER — SODIUM CHLORIDE 9 MG/ML
1000 INJECTION INTRAMUSCULAR; INTRAVENOUS; SUBCUTANEOUS ONCE
Refills: 0 | Status: COMPLETED | OUTPATIENT
Start: 2023-08-02 | End: 2023-08-02

## 2023-08-02 RX ORDER — ACETAMINOPHEN 500 MG
1000 TABLET ORAL ONCE
Refills: 0 | Status: DISCONTINUED | OUTPATIENT
Start: 2023-08-02 | End: 2023-08-03

## 2023-08-02 RX ORDER — HEPARIN SODIUM 5000 [USP'U]/ML
5000 INJECTION INTRAVENOUS; SUBCUTANEOUS EVERY 8 HOURS
Refills: 0 | Status: DISCONTINUED | OUTPATIENT
Start: 2023-08-02 | End: 2023-08-03

## 2023-08-02 RX ORDER — FAMOTIDINE 10 MG/ML
20 INJECTION INTRAVENOUS ONCE
Refills: 0 | Status: COMPLETED | OUTPATIENT
Start: 2023-08-02 | End: 2023-08-02

## 2023-08-02 RX ORDER — ACETAMINOPHEN 500 MG
650 TABLET ORAL ONCE
Refills: 0 | Status: COMPLETED | OUTPATIENT
Start: 2023-08-02 | End: 2023-08-02

## 2023-08-02 RX ORDER — SODIUM CHLORIDE 9 MG/ML
1000 INJECTION, SOLUTION INTRAVENOUS
Refills: 0 | Status: DISCONTINUED | OUTPATIENT
Start: 2023-08-02 | End: 2023-08-03

## 2023-08-02 RX ADMIN — Medication 650 MILLIGRAM(S): at 22:48

## 2023-08-02 RX ADMIN — FAMOTIDINE 20 MILLIGRAM(S): 10 INJECTION INTRAVENOUS at 20:53

## 2023-08-02 RX ADMIN — HEPARIN SODIUM 5000 UNIT(S): 5000 INJECTION INTRAVENOUS; SUBCUTANEOUS at 22:40

## 2023-08-02 RX ADMIN — Medication 650 MILLIGRAM(S): at 20:53

## 2023-08-02 RX ADMIN — SODIUM CHLORIDE 1000 MILLILITER(S): 9 INJECTION INTRAMUSCULAR; INTRAVENOUS; SUBCUTANEOUS at 20:54

## 2023-08-02 RX ADMIN — SODIUM CHLORIDE 110 MILLILITER(S): 9 INJECTION, SOLUTION INTRAVENOUS at 22:57

## 2023-08-02 NOTE — ED PROVIDER NOTE - CLINICAL SUMMARY MEDICAL DECISION MAKING FREE TEXT BOX
62-year-old female past medical history of gastritis, no past surgical history presents with  4-day history of epigastric and right upper quadrant pain, sent by her general surgeon for rule out cholecystitis.  On exam, /76, VS otherwise wnl, +RUQ ttp, epigastric ttp, +Diaz's sign.    ddx: cholelithiasis vs cholecystitis vs pancreatitis    Plan:  - labs: elevated ast/alt/alp, normal bili and lipase  - ua wnl no e/o uti  - us ruq performed pending final read, reviewed by surgery team states it shows gallstones and okay to admit to general surgery. will obtain CTAP before sending upstairs to r/o kidney stone due to L CVAT, ordered  - tylenol, ivf  - admit

## 2023-08-02 NOTE — H&P ADULT - NSHPPHYSICALEXAM_GEN_ALL_CORE
General: Patient is doing well and lying in bed comfortably  Constitutional: alert and oriented   Pulm: Nonlabored breathing, no respiratory distress  CV: Regular rate and rhythm, normal sinus rhythm  Abd:  soft, mildly tender in RUQ and epigastrium, no Diaz's sign. nondistended. No rebound, no guarding.   Extremities: warm, well perfused, no edema

## 2023-08-02 NOTE — ED ADULT NURSE NOTE - OBJECTIVE STATEMENT
62 y.o F a&ox4 walked in from triage c.o abdominal pain . since Sunday pt reports abdominal pain. was seen at [facility] for abdominal pain, was sent home. PT states "its my gallbladder." multiple episodes of vomiting since sunday.  PMH of gall stones.

## 2023-08-02 NOTE — PATIENT PROFILE ADULT - FUNCTIONAL ASSESSMENT - BASIC MOBILITY 6.
4-calculated by average/Not able to assess (calculate score using [facility] averaging method)

## 2023-08-02 NOTE — PATIENT PROFILE ADULT - INTERPRETATION SERVICES DECLINED
Pt refused  services. Pt educated on importance of  services. RN able to communicate with patient and spouse in Dominican.

## 2023-08-02 NOTE — H&P ADULT - HISTORY OF PRESENT ILLNESS
The patient is a 62 year old female with a PMHx of gastritis diagnosed on EGD and no PSHx presenting with upper abdominal pain, primarily in RUQ abdominal pain associated with some nausea, no emesis. She states that she tried to induce vomiting, successfully which alleviated some of the pain but it persists. It is sharp and constant in nature. Does not improve or worsen with food or positioning. She states that the first time she had an episode like this was January, she was seen in the ED but discharged with GI follow up for gastritis. Now the pain restarted on Sunday, she presented to emergency room in [place] who suspected she may have cholecystitis however she wanted a second opinion and thus saw Dr. Carmona in clinic today. She also endorses left flank pain, but no urinary symptoms. Denies further vomiting, changes in bowel movements or flatus - last BM was today and normal, contineus to pass gas. Denies chest pain, shortness of breath, calf pain, fever, chills.     PMHx - gastritis  PSHx - none  Meds - none  Allergies - none  SoHx - none  FamHx - no history of cancer, gallbladder problems  CScope - never, EGD in Jan 2023 with non erosive gastritis, duodenitis, hiatal hernia    In the ED she is afebrile, nontachycardic, normotensive, satting well on RA.   On exam her abdomen is soft, tender to palpation in epigastric region and RUQ without Diaz's sign, nondistended. No rebound or guarding. She has left CVA tenderness to palpation, no TTP on right side.  Labs with normal white count, no left shift, Tbili 1.0, , AST 85   RUQ US with cholelithiasis

## 2023-08-02 NOTE — H&P ADULT - NSHPLABSRESULTS_GEN_ALL_CORE
LABS:                        12.8   8.42  )-----------( 285      ( 02 Aug 2023 20:08 )             38.9     08-02    138  |  101  |  14  ----------------------------<  99  3.9   |  29  |  0.67    Ca    9.2      02 Aug 2023 20:08    TPro  7.4  /  Alb  4.3  /  TBili  1.0  /  DBili  x   /  AST  85<H>  /  ALT  297<H>  /  AlkPhos  158<H>  08-02    Urinalysis Basic - ( 02 Aug 2023 20:08 )  Color: Yellow / Appearance: Clear / SG: >=1.030 / pH: x  Gluc: 99 mg/dL / Ketone: NEGATIVE  / Bili: Negative / Urobili: 0.2 E.U./dL   Blood: x / Protein: NEGATIVE mg/dL / Nitrite: NEGATIVE   Leuk Esterase: NEGATIVE / RBC: < 5 /HPF / WBC < 5 /HPF   Sq Epi: x / Non Sq Epi: x / Bacteria: Present /HPF

## 2023-08-02 NOTE — ED PROVIDER NOTE - PHYSICAL EXAMINATION
GENERAL:  Awake, alert and in NAD, non-toxic appearing  ENMT: Airway patent  EYES: conjunctiva clear  CARDIAC: Regular rate, regular rhythm.  Heart sounds S1, S2, no S3, S4. No murmurs, rubs or gallops.  RESPIRATORY: Breath sounds clear and equal in bilateral anterior lung fields, no wheezes/ronchi/crackles/stridor; pt breathing and speaking comfortably with no increased WOB, no accessory mm. use, no intercostal retractions, no nasal flaring  GI: abdomen soft, non-distended, +ttp in RUQ, epigastric region no rebound or guarding. + [place] sign, no ttp in luq/rlq/llq. + L cvat, no R cvat  SKIN: warm and dry, no rashes  PSYCH: awake, alert, calm and cooperative  EXTREMITIES: no ble edema  NEURO: gcs 15

## 2023-08-02 NOTE — ED ADULT NURSE NOTE - CAS EDN DISCHARGE ASSESSMENT
Alert and oriented to person, place and time Detail Level: Simple Render Risk Assessment In Note?: no Additional Notes: Accutane start later this month. Will tx w erythromycin until then and possibly into the first month of accutane

## 2023-08-02 NOTE — PATIENT PROFILE ADULT - FALL HARM RISK - UNIVERSAL INTERVENTIONS
Bed in lowest position, wheels locked, appropriate side rails in place/Call bell, personal items and telephone in reach/Instruct patient to call for assistance before getting out of bed or chair/Non-slip footwear when patient is out of bed/[place] to call system/Physically safe environment - no spills, clutter or unnecessary equipment/Purposeful Proactive Rounding/Room/bathroom lighting operational, light cord in reach

## 2023-08-02 NOTE — H&P ADULT - ASSESSMENT
The patient is a 62 year old female with a PMHx of gastritis, no PSHx presenting with 4 days of RUQ abdominal pain associated with nausea and left flank pain, labs with normal WBC and elevated LFTs admitted for suspected cholelithiasis.     Admit to regional, Dr. Carmona  CTAP with NO Contrast - Renal Stone Valerio  NPO/IVF  Pain/nausea control prn  No Abx  SQH/SCDs/OOBA/IS  AM Labs  Preop for OR tomorrow. shanae abbasi  Discussed with chief resident and attending physician

## 2023-08-02 NOTE — ED ADULT NURSE NOTE - NSFALLUNIVINTERV_ED_ALL_ED
Bed/Stretcher in lowest position, wheels locked, appropriate side rails in place/Call bell, personal items and telephone in reach/Instruct patient to call for assistance before getting out of bed/chair/stretcher/Non-slip footwear applied when patient is off stretcher/[place] to call system/Physically safe environment - no spills, clutter or unnecessary equipment/Purposeful proactive rounding/Room/bathroom lighting operational, light cord in reach

## 2023-08-02 NOTE — ED PROVIDER NOTE - NS ED ROS FT
Positive: Abdominal pain, nausea, vomiting   negative: Fever, chills, congestion, cough, chest pain, shortness of breath, diarrhea, dysuria, hematuria, leg edema, rash

## 2023-08-03 ENCOUNTER — TRANSCRIPTION ENCOUNTER (OUTPATIENT)
Age: 63
End: 2023-08-03

## 2023-08-03 LAB
ALBUMIN SERPL ELPH-MCNC: 4.1 G/DL — SIGNIFICANT CHANGE UP (ref 3.3–5)
ALP SERPL-CCNC: 150 U/L — HIGH (ref 40–120)
ALT FLD-CCNC: 253 U/L — HIGH (ref 10–45)
ANION GAP SERPL CALC-SCNC: 8 MMOL/L — SIGNIFICANT CHANGE UP (ref 5–17)
APTT BLD: 37.4 SEC — HIGH (ref 24.5–35.6)
AST SERPL-CCNC: 80 U/L — HIGH (ref 10–40)
BILIRUB SERPL-MCNC: 1.1 MG/DL — SIGNIFICANT CHANGE UP (ref 0.2–1.2)
BUN SERPL-MCNC: 9 MG/DL — SIGNIFICANT CHANGE UP (ref 7–23)
CALCIUM SERPL-MCNC: 8.6 MG/DL — SIGNIFICANT CHANGE UP (ref 8.4–10.5)
CHLORIDE SERPL-SCNC: 104 MMOL/L — SIGNIFICANT CHANGE UP (ref 96–108)
CO2 SERPL-SCNC: 28 MMOL/L — SIGNIFICANT CHANGE UP (ref 22–31)
CREAT SERPL-MCNC: 0.54 MG/DL — SIGNIFICANT CHANGE UP (ref 0.5–1.3)
EGFR: 105 ML/MIN/1.73M2 — SIGNIFICANT CHANGE UP
GLUCOSE SERPL-MCNC: 92 MG/DL — SIGNIFICANT CHANGE UP (ref 70–99)
HCT VFR BLD CALC: 42.1 % — SIGNIFICANT CHANGE UP (ref 34.5–45)
HCV AB S/CO SERPL IA: 0.04 S/CO — SIGNIFICANT CHANGE UP
HCV AB SERPL-IMP: SIGNIFICANT CHANGE UP
HGB BLD-MCNC: 13.5 G/DL — SIGNIFICANT CHANGE UP (ref 11.5–15.5)
INR BLD: 1.04 — SIGNIFICANT CHANGE UP (ref 0.85–1.18)
MAGNESIUM SERPL-MCNC: 2.1 MG/DL — SIGNIFICANT CHANGE UP (ref 1.6–2.6)
MCHC RBC-ENTMCNC: 31.3 PG — SIGNIFICANT CHANGE UP (ref 27–34)
MCHC RBC-ENTMCNC: 32.1 GM/DL — SIGNIFICANT CHANGE UP (ref 32–36)
MCV RBC AUTO: 97.7 FL — SIGNIFICANT CHANGE UP (ref 80–100)
NRBC # BLD: 0 /100 WBCS — SIGNIFICANT CHANGE UP (ref 0–0)
PHOSPHATE SERPL-MCNC: 3.1 MG/DL — SIGNIFICANT CHANGE UP (ref 2.5–4.5)
PLATELET # BLD AUTO: 285 K/UL — SIGNIFICANT CHANGE UP (ref 150–400)
POTASSIUM SERPL-MCNC: 3.9 MMOL/L — SIGNIFICANT CHANGE UP (ref 3.5–5.3)
POTASSIUM SERPL-SCNC: 3.9 MMOL/L — SIGNIFICANT CHANGE UP (ref 3.5–5.3)
PROT SERPL-MCNC: 7.1 G/DL — SIGNIFICANT CHANGE UP (ref 6–8.3)
PROTHROM AB SERPL-ACNC: 11.8 SEC — SIGNIFICANT CHANGE UP (ref 9.5–13)
RBC # BLD: 4.31 M/UL — SIGNIFICANT CHANGE UP (ref 3.8–5.2)
RBC # FLD: 12.2 % — SIGNIFICANT CHANGE UP (ref 10.3–14.5)
SODIUM SERPL-SCNC: 140 MMOL/L — SIGNIFICANT CHANGE UP (ref 135–145)
WBC # BLD: 5.84 K/UL — SIGNIFICANT CHANGE UP (ref 3.8–10.5)
WBC # FLD AUTO: 5.84 K/UL — SIGNIFICANT CHANGE UP (ref 3.8–10.5)

## 2023-08-03 PROCEDURE — 88304 TISSUE EXAM BY PATHOLOGIST: CPT | Mod: 26

## 2023-08-03 DEVICE — CLIP LIGATING VESOLOCK MED/LG GRN: Type: IMPLANTABLE DEVICE | Status: FUNCTIONAL

## 2023-08-03 DEVICE — URETERAL CATH OPEN END 5FR 70CM: Type: IMPLANTABLE DEVICE | Status: FUNCTIONAL

## 2023-08-03 DEVICE — STAPLER COVIDIEN TRI-STAPLE 45MM PURPLE RELOAD: Type: IMPLANTABLE DEVICE | Status: FUNCTIONAL

## 2023-08-03 RX ORDER — PANTOPRAZOLE SODIUM 20 MG/1
40 TABLET, DELAYED RELEASE ORAL
Refills: 0 | Status: DISCONTINUED | OUTPATIENT
Start: 2023-08-03 | End: 2023-08-04

## 2023-08-03 RX ORDER — ONDANSETRON 8 MG/1
4 TABLET, FILM COATED ORAL EVERY 6 HOURS
Refills: 0 | Status: DISCONTINUED | OUTPATIENT
Start: 2023-08-03 | End: 2023-08-04

## 2023-08-03 RX ORDER — ACETAMINOPHEN 500 MG
1000 TABLET ORAL EVERY 6 HOURS
Refills: 0 | Status: DISCONTINUED | OUTPATIENT
Start: 2023-08-03 | End: 2023-08-04

## 2023-08-03 RX ORDER — ACETAMINOPHEN 500 MG
1000 TABLET ORAL ONCE
Refills: 0 | Status: COMPLETED | OUTPATIENT
Start: 2023-08-03 | End: 2023-08-03

## 2023-08-03 RX ORDER — HYDROMORPHONE HYDROCHLORIDE 2 MG/ML
0.5 INJECTION INTRAMUSCULAR; INTRAVENOUS; SUBCUTANEOUS ONCE
Refills: 0 | Status: DISCONTINUED | OUTPATIENT
Start: 2023-08-03 | End: 2023-08-03

## 2023-08-03 RX ORDER — SODIUM CHLORIDE 9 MG/ML
1000 INJECTION, SOLUTION INTRAVENOUS
Refills: 0 | Status: DISCONTINUED | OUTPATIENT
Start: 2023-08-03 | End: 2023-08-03

## 2023-08-03 RX ORDER — OXYCODONE HYDROCHLORIDE 5 MG/1
2.5 TABLET ORAL EVERY 6 HOURS
Refills: 0 | Status: DISCONTINUED | OUTPATIENT
Start: 2023-08-03 | End: 2023-08-04

## 2023-08-03 RX ORDER — ONDANSETRON 8 MG/1
4 TABLET, FILM COATED ORAL ONCE
Refills: 0 | Status: COMPLETED | OUTPATIENT
Start: 2023-08-03 | End: 2023-08-03

## 2023-08-03 RX ORDER — OXYCODONE HYDROCHLORIDE 5 MG/1
5 TABLET ORAL EVERY 6 HOURS
Refills: 0 | Status: DISCONTINUED | OUTPATIENT
Start: 2023-08-03 | End: 2023-08-04

## 2023-08-03 RX ORDER — LABETALOL HCL 100 MG
10 TABLET ORAL ONCE
Refills: 0 | Status: COMPLETED | OUTPATIENT
Start: 2023-08-03 | End: 2023-08-03

## 2023-08-03 RX ORDER — HEPARIN SODIUM 5000 [USP'U]/ML
5000 INJECTION INTRAVENOUS; SUBCUTANEOUS EVERY 8 HOURS
Refills: 0 | Status: DISCONTINUED | OUTPATIENT
Start: 2023-08-03 | End: 2023-08-04

## 2023-08-03 RX ADMIN — SODIUM CHLORIDE 100 MILLILITER(S): 9 INJECTION, SOLUTION INTRAVENOUS at 12:49

## 2023-08-03 RX ADMIN — OXYCODONE HYDROCHLORIDE 5 MILLIGRAM(S): 5 TABLET ORAL at 17:00

## 2023-08-03 RX ADMIN — PANTOPRAZOLE SODIUM 40 MILLIGRAM(S): 20 TABLET, DELAYED RELEASE ORAL at 17:03

## 2023-08-03 RX ADMIN — ONDANSETRON 4 MILLIGRAM(S): 8 TABLET, FILM COATED ORAL at 19:04

## 2023-08-03 RX ADMIN — HYDROMORPHONE HYDROCHLORIDE 0.5 MILLIGRAM(S): 2 INJECTION INTRAMUSCULAR; INTRAVENOUS; SUBCUTANEOUS at 13:13

## 2023-08-03 RX ADMIN — HEPARIN SODIUM 5000 UNIT(S): 5000 INJECTION INTRAVENOUS; SUBCUTANEOUS at 05:52

## 2023-08-03 RX ADMIN — HEPARIN SODIUM 5000 UNIT(S): 5000 INJECTION INTRAVENOUS; SUBCUTANEOUS at 19:05

## 2023-08-03 RX ADMIN — Medication 400 MILLIGRAM(S): at 14:17

## 2023-08-03 RX ADMIN — HYDROMORPHONE HYDROCHLORIDE 0.5 MILLIGRAM(S): 2 INJECTION INTRAMUSCULAR; INTRAVENOUS; SUBCUTANEOUS at 13:20

## 2023-08-03 RX ADMIN — ONDANSETRON 4 MILLIGRAM(S): 8 TABLET, FILM COATED ORAL at 17:04

## 2023-08-03 RX ADMIN — Medication 1000 MILLIGRAM(S): at 15:00

## 2023-08-03 RX ADMIN — SODIUM CHLORIDE 100 MILLILITER(S): 9 INJECTION, SOLUTION INTRAVENOUS at 16:22

## 2023-08-03 RX ADMIN — OXYCODONE HYDROCHLORIDE 5 MILLIGRAM(S): 5 TABLET ORAL at 16:22

## 2023-08-03 RX ADMIN — HYDROMORPHONE HYDROCHLORIDE 0.5 MILLIGRAM(S): 2 INJECTION INTRAMUSCULAR; INTRAVENOUS; SUBCUTANEOUS at 12:47

## 2023-08-03 RX ADMIN — HYDROMORPHONE HYDROCHLORIDE 0.5 MILLIGRAM(S): 2 INJECTION INTRAMUSCULAR; INTRAVENOUS; SUBCUTANEOUS at 13:50

## 2023-08-03 RX ADMIN — Medication 10 MILLIGRAM(S): at 14:36

## 2023-08-03 NOTE — BRIEF OPERATIVE NOTE - NSICDXBRIEFPROCEDURE_GEN_ALL_CORE_FT
PROCEDURES:  Cholecystectomy, laparoscopic, with intraoperative cholangiogram 03-Aug-2023 12:41:14  Jr Kohler  Laparoscopic appendectomy 03-Aug-2023 12:41:20  Jr Kohler

## 2023-08-03 NOTE — DISCHARGE NOTE PROVIDER - NSDCFUADDINST_GEN_ALL_CORE_FT
General Discharge Instructions:  Please resume all regular home medications unless specifically advised not to take a particular medication. Also, please take any new medications as prescribed.  Please get plenty of rest, continue to ambulate several times per day, and drink adequate amounts of fluids. Avoid lifting weights greater than 5-10 lbs until you follow-up with your surgeon, who will instruct you further regarding activity restrictions.  Avoid driving or operating heavy machinery while taking pain medications.  Please follow-up with your surgeon and Primary Care Provider (PCP) as advised.  Incision Care:  *Please call your doctor or nurse practitioner if you have increased pain, swelling, redness, or drainage from the incision site.  *Avoid swimming and baths until your follow-up appointment.  *You may shower, and wash surgical incisions with a mild soap and warm water. Gently pat the area dry.  *You have staples, they will be removed at your follow-up appointment.  *You have steri-strips, they will fall off on their own. Please remove any remaining strips 7-10 days after surgery.  *You incisions are covered with Derma-Bond, when in the shower, do not scrub or pat dry.    Warning Signs:  Please call your doctor or nurse practitioner if you experience the following:  *You experience new chest pain, pressure, squeezing or tightness.  *New or worsening cough, shortness of breath, or wheeze.  *If you are vomiting and cannot keep down fluids or your medications.  *You are getting dehydrated due to continued vomiting, diarrhea, or other reasons. Signs of dehydration include dry mouth, rapid heartbeat, or feeling dizzy or faint when standing.  *You see blood or dark/black material when you vomit or have a bowel movement.  *You experience burning when you urinate, have blood in your urine, or experience a discharge.  *Your pain is not improving within 8-12 hours or is not gone within 24 hours. Call or return immediately if your pain is getting worse, changes location, or moves to your chest or back.  *You have shaking chills, or fever greater than 101.5 degrees Fahrenheit or 38 degrees Celsius.  *Any change in your symptoms, or any new symptoms that concern you.  General Discharge Instructions:  Please resume all regular home medications unless specifically advised not to take a particular medication. Also, please take any new medications as prescribed.  Please get plenty of rest, continue to ambulate several times per day, and drink adequate amounts of fluids. Avoid lifting weights greater than 5-10 lbs until you follow-up with your surgeon, who will instruct you further regarding activity restrictions.  Avoid driving or operating heavy machinery while taking pain medications.  Please follow-up with your surgeon and Primary Care Provider (PCP) as advised.  Incision Care:  *Please call your doctor or nurse practitioner if you have increased pain, swelling, redness, or drainage from the incision site.  *Avoid swimming and baths until your follow-up appointment.  *You may shower, and wash surgical incisions with a mild soap and warm water. Gently pat the area dry.  *You incisions are covered with Derma-Bond, when in the shower, do not scrub or pat dry.    Warning Signs:  Please call your doctor or nurse practitioner if you experience the following:  *You experience new chest pain, pressure, squeezing or tightness.  *New or worsening cough, shortness of breath, or wheeze.  *If you are vomiting and cannot keep down fluids or your medications.  *You are getting dehydrated due to continued vomiting, diarrhea, or other reasons. Signs of dehydration include dry mouth, rapid heartbeat, or feeling dizzy or faint when standing.  *You see blood or dark/black material when you vomit or have a bowel movement.  *You experience burning when you urinate, have blood in your urine, or experience a discharge.  *Your pain is not improving within 8-12 hours or is not gone within 24 hours. Call or return immediately if your pain is getting worse, changes location, or moves to your chest or back.  *You have shaking chills, or fever greater than 101.5 degrees Fahrenheit or 38 degrees Celsius.  *Any change in your symptoms, or any new symptoms that concern you.     Oxycodone: Please take as prescribed. Do not take more than 4 tablets a day. If you experience constipation with the medication you can take Colace once a day. If you experience any shortness of breath or altered mental status while taking the medication, use the naloxone nasal spray immediate, stop using the oxycodone, and go to the ED. You are able to take Tylenol over the counter as directed on the bottle.  General Discharge Instructions:  Please resume all regular home medications unless specifically advised not to take a particular medication. Also, please take any new medications as prescribed.  Please get plenty of rest, continue to ambulate several times per day, and drink adequate amounts of fluids. Avoid lifting weights greater than 5-10 lbs until you follow-up with your surgeon, who will instruct you further regarding activity restrictions.  Avoid driving or operating heavy machinery while taking pain medications.  Please follow-up with your surgeon and Primary Care Provider (PCP) as advised.  Incision Care:  *Please call your doctor or nurse practitioner if you have increased pain, swelling, redness, or drainage from the incision site.  *Avoid swimming and baths until your follow-up appointment.  *You may shower, and wash surgical incisions with a mild soap and warm water. Gently pat the area dry.  *You incisions are covered with Derma-Bond, when in the shower, do not scrub or pat dry.    Warning Signs:  Please call your doctor or nurse practitioner if you experience the following:  *You experience new chest pain, pressure, squeezing or tightness.  *New or worsening cough, shortness of breath, or wheeze.  *If you are vomiting and cannot keep down fluids or your medications.  *You are getting dehydrated due to continued vomiting, diarrhea, or other reasons. Signs of dehydration include dry mouth, rapid heartbeat, or feeling dizzy or faint when standing.  *You see blood or dark/black material when you vomit or have a bowel movement.  *You experience burning when you urinate, have blood in your urine, or experience a discharge.  *Your pain is not improving within 8-12 hours or is not gone within 24 hours. Call or return immediately if your pain is getting worse, changes location, or moves to your chest or back.  *You have shaking chills, or fever greater than 101.5 degrees Fahrenheit or 38 degrees Celsius.  *Any change in your symptoms, or any new symptoms that concern you.     Oxycodone: Please take as prescribed. Do not take more than 4 tablets a day. If you experience constipation with the medication you can take Colace once a day.  You are able to take Tylenol over the counter as directed on the bottle.

## 2023-08-03 NOTE — DISCHARGE NOTE PROVIDER - NSDCCPTREATMENT_GEN_ALL_CORE_FT
PRINCIPAL PROCEDURE  Procedure: Cholecystectomy, laparoscopic, with intraoperative cholangiogram  Findings and Treatment:       SECONDARY PROCEDURE  Procedure: Laparoscopic appendectomy  Findings and Treatment:

## 2023-08-03 NOTE — DISCHARGE NOTE PROVIDER - NSDCMRMEDTOKEN_GEN_ALL_CORE_FT
Colace 100 mg oral capsule: 1 cap(s) orally once a day as needed for  opioid constipation  oxyCODONE 5 mg oral tablet: 1 tab(s) orally every 6 hours as needed for severe pain MDD: 4 tablets

## 2023-08-03 NOTE — BRIEF OPERATIVE NOTE - NSICDXBRIEFPOSTOP_GEN_ALL_CORE_FT
POST-OP DIAGNOSIS:  Acute cholecystitis 03-Aug-2023 12:40:32 without choledocholithiasis Jr Kohler

## 2023-08-03 NOTE — DISCHARGE NOTE PROVIDER - HOSPITAL COURSE
The patient is a 62 year old female with a PMHx of gastritis, no PSHx presenting with 4 days of RUQ abdominal pain associated with nausea and left flank pain, labs with normal WBC and elevated LFTs admitted for suspected cholelithiasis. She went to the OR 08/03 for lap cholecystectomy          LAST UPDATED 08/03***** The patient is a 62 year old female with a PMHx of gastritis, no PSHx presenting with 4 days of RUQ abdominal pain associated with nausea and left flank pain, labs with normal WBC and elevated LFTs admitted for suspected cholelithiasis. She went to the OR 08/03 for lap cholecystectomy, IOC, and appendectomy. In postoperative recovery, she experience epigastric pain along with hypertension to SBP 190s, she was given pain medication and labetalol 10mg which response, decreasing her pain and blood pressure. She was still nausea post op, so stayed overnight for further monitoring. The rest of her postoperative course was unremarkable with advancement of diet and pain control. On day of discharge patient was stable to be d/c'd home.

## 2023-08-03 NOTE — DISCHARGE NOTE PROVIDER - CARE PROVIDER_API CALL
Marjan Carmona [facility]  Surgery  [address] [place] [address], [address]  [place] [address]  Phone: [phone]  Follow Up Time: 1 week

## 2023-08-03 NOTE — PROGRESS NOTE ADULT - SUBJECTIVE AND OBJECTIVE BOX
Team 5 Surgery Post-Op Note, PCN:     Pre-Op Dx: Acute cholecystitis   Procedure: Cholecystectomy, laparoscopic, with intraoperative cholangiogram    Laparoscopic appendectomy      Surgeon: Mathew    Subjective: Patient seen at bedside. Patient reports being in 10/10 epigastric pain. She denies any chest pain, head pain, nausea, or shortness of breath.       Vital Signs Last 24 Hrs  T(C): 36.4 (03 Aug 2023 08:39), Max: 36.8 (02 Aug 2023 18:55)  T(F): 97.6 (03 Aug 2023 06:38), Max: 98.3 (02 Aug 2023 18:55)  HR: 66 (03 Aug 2023 08:39) (65 - 77)  BP: 111/69 (03 Aug 2023 08:39) (111/69 - 151/76)  BP(mean): --  RR: 17 (03 Aug 2023 08:39) (16 - 17)  SpO2: 98% (03 Aug 2023 08:39) (97% - 98%)    Parameters below as of 03 Aug 2023 04:33  Patient On (Oxygen Delivery Method): room air        Physical Exam:  General: NAD, resting comfortably in bed  Pulmonary: Nonlabored breathing, no respiratory distress  Cardiovascular: NSR  Abdominal: soft, Appropriate incisional tenderness. Incision clean, dry, and intact with dermabond   Extremities: WWP, normal strength  Neuro: A/O x 3, CNs II-XII grossly intact, no focal deficits, normal sensation  Pulses: palpable distal pulses      LABS:                        13.5   5.84  )-----------( 285      ( 03 Aug 2023 05:30 )             42.1     08-03    140  |  104  |  9   ----------------------------<  92  3.9   |  28  |  0.54    Ca    8.6      03 Aug 2023 05:30  Phos  3.1     08-03  Mg     2.1     08-03    TPro  7.1  /  Alb  4.1  /  TBili  1.1  /  DBili  x   /  AST  80<H>  /  ALT  253<H>  /  AlkPhos  150<H>  08-03    PT/INR - ( 03 Aug 2023 05:30 )   PT: 11.8 sec;   INR: 1.04          PTT - ( 03 Aug 2023 05:30 )  PTT:37.4 sec  CAPILLARY BLOOD GLUCOSE        Urinalysis Basic - ( 03 Aug 2023 05:30 )    Color: x / Appearance: x / SG: x / pH: x  Gluc: 92 mg/dL / Ketone: x  / Bili: x / Urobili: x   Blood: x / Protein: x / Nitrite: x   Leuk Esterase: x / RBC: x / WBC x   Sq Epi: x / Non Sq Epi: x / Bacteria: x      LIVER FUNCTIONS - ( 03 Aug 2023 05:30 )  Alb: 4.1 g/dL / Pro: 7.1 g/dL / ALK PHOS: 150 U/L / ALT: 253 U/L / AST: 80 U/L / GGT: x                 Radiology and Additional Studies:

## 2023-08-03 NOTE — BRIEF OPERATIVE NOTE - OPERATION/FINDINGS
Procedure: Laparoscopic cholecystectomy, with intraoperative cholangiogram, laparoscopic appendectomy Procedure: Laparoscopic cholecystectomy, with intraoperative cholangiogram, laparoscopic appendectomy    Patient prepped and draped in sterile fashion. Lilliam cutdown performed vertically just inferior to umbilicus. Gallbladder edematous, inflamed, however was not perforated, nongangrenous. Appendix identified, was dilated from tip to approximately 2/3 of distance to the base. The base was not dilated. Additional three laparoscopic ports placed under direct visualization in RUQ subcostally. Critical view of safety was achieved. Cystic artery was clipped x3 and transected. Distal clip placed on cystic artery, ductotomy created in cystic duct, Intraoperative cholangiogram was shot under direct fluoroscopy, without any filling defects seen. Proximal cystic duct clipped x2 and transected, without leakage of bile noted. Dissection continued to free gallbladder from cystic plate. Adequate hemostasis achieved. Attention continued to appendix. Additional LLQ port site was placed. Appendix was bluntly freed of adhesion, meosappendix was transected with ligasure. EndoGIA stapler used to transect appendiceal base. Adequate hemostasis at staple line. TAP block performed bilaterally. Umbilical fascia closed with 0-0 vicryl sutures. Port sites closed with 4-0 monocryl.

## 2023-08-03 NOTE — PROGRESS NOTE ADULT - SUBJECTIVE AND OBJECTIVE BOX
Overnight events: admitted to the hospital   INCOMPLETE!!!    SUBJECTIVE: Patient seen at bedside with chief resident.       MEDICATIONS  (STANDING):  heparin   Injectable 5000 Unit(s) SubCutaneous every 8 hours  lactated ringers. 1000 milliLiter(s) (110 mL/Hr) IV Continuous <Continuous>    MEDICATIONS  (PRN):  acetaminophen   IVPB .. 1000 milliGRAM(s) IV Intermittent once PRN Mild Pain (1 - 3)  HYDROmorphone  Injectable 0.25 milliGRAM(s) IV Push every 6 hours PRN Severe Pain (7 - 10)  ondansetron Injectable 4 milliGRAM(s) IV Push every 6 hours PRN Nausea      Vital Signs Last 24 Hrs  T(C): 36.4 (03 Aug 2023 04:33), Max: 36.8 (02 Aug 2023 18:55)  T(F): 97.6 (03 Aug 2023 04:33), Max: 98.3 (02 Aug 2023 18:55)  HR: 66 (03 Aug 2023 04:33) (65 - 77)  BP: 111/69 (03 Aug 2023 04:33) (111/69 - 151/76)  BP(mean): --  RR: 17 (03 Aug 2023 04:33) (16 - 17)  SpO2: 98% (03 Aug 2023 04:33) (97% - 98%)    Parameters below as of 03 Aug 2023 04:33  Patient On (Oxygen Delivery Method): room air        Physical Exam:  General: NAD, resting comfortably in bed  Pulmonary: Nonlabored breathing, no respiratory distress  Cardiovascular: NSR  Abdominal: soft, NT/ND  Extremities: WWP, normal strength  Neuro: A/O x 3, CNs II-XII grossly intact, no focal deficits, normal motor/sensation  Pulses: palpable distal pulses    I&O's Summary    02 Aug 2023 07:01  -  03 Aug 2023 06:04  --------------------------------------------------------  IN: 660 mL / OUT: 600 mL / NET: 60 mL        LABS:                        12.8   8.42  )-----------( 285      ( 02 Aug 2023 20:08 )             38.9     08-02    138  |  101  |  14  ----------------------------<  99  3.9   |  29  |  0.67    Ca    9.2      02 Aug 2023 20:08    TPro  7.4  /  Alb  4.3  /  TBili  1.0  /  DBili  x   /  AST  85<H>  /  ALT  297<H>  /  AlkPhos  158<H>  08-02      Urinalysis Basic - ( 02 Aug 2023 20:08 )    Color: Yellow / Appearance: Clear / SG: >=1.030 / pH: x  Gluc: 99 mg/dL / Ketone: NEGATIVE  / Bili: Negative / Urobili: 0.2 E.U./dL   Blood: x / Protein: NEGATIVE mg/dL / Nitrite: NEGATIVE   Leuk Esterase: NEGATIVE / RBC: < 5 /HPF / WBC < 5 /HPF   Sq Epi: x / Non Sq Epi: x / Bacteria: Present /HPF      CAPILLARY BLOOD GLUCOSE        LIVER FUNCTIONS - ( 02 Aug 2023 20:08 )  Alb: 4.3 g/dL / Pro: 7.4 g/dL / ALK PHOS: 158 U/L / ALT: 297 U/L / AST: 85 U/L / GGT: x             RADIOLOGY & ADDITIONAL STUDIES:   Overnight events: admitted to the hospital     SUBJECTIVE: Patient seen at bedside with chief resident. Patient reports mild tenderness to the RUQ. She denies any nausea or vomiting.,        MEDICATIONS  (STANDING):  heparin   Injectable 5000 Unit(s) SubCutaneous every 8 hours  lactated ringers. 1000 milliLiter(s) (110 mL/Hr) IV Continuous <Continuous>    MEDICATIONS  (PRN):  acetaminophen   IVPB .. 1000 milliGRAM(s) IV Intermittent once PRN Mild Pain (1 - 3)  HYDROmorphone  Injectable 0.25 milliGRAM(s) IV Push every 6 hours PRN Severe Pain (7 - 10)  ondansetron Injectable 4 milliGRAM(s) IV Push every 6 hours PRN Nausea      Vital Signs Last 24 Hrs  T(C): 36.4 (03 Aug 2023 04:33), Max: 36.8 (02 Aug 2023 18:55)  T(F): 97.6 (03 Aug 2023 04:33), Max: 98.3 (02 Aug 2023 18:55)  HR: 66 (03 Aug 2023 04:33) (65 - 77)  BP: 111/69 (03 Aug 2023 04:33) (111/69 - 151/76)  BP(mean): --  RR: 17 (03 Aug 2023 04:33) (16 - 17)  SpO2: 98% (03 Aug 2023 04:33) (97% - 98%)    Parameters below as of 03 Aug 2023 04:33  Patient On (Oxygen Delivery Method): room air        Physical Exam:  General: NAD, resting comfortably in bed  Pulmonary: Nonlabored breathing, no respiratory distress  Cardiovascular: NSR  Abdominal: soft, ND, mild tenderness along RUQ  Extremities: WWP, normal strength  Neuro: A/O x 3, CNs II-XII grossly intact, no focal deficits, normal motor/sensation  Pulses: palpable distal pulses    I&O's Summary    02 Aug 2023 07:01  -  03 Aug 2023 06:04  --------------------------------------------------------  IN: 660 mL / OUT: 600 mL / NET: 60 mL        LABS:                        12.8   8.42  )-----------( 285      ( 02 Aug 2023 20:08 )             38.9     08-02    138  |  101  |  14  ----------------------------<  99  3.9   |  29  |  0.67    Ca    9.2      02 Aug 2023 20:08    TPro  7.4  /  Alb  4.3  /  TBili  1.0  /  DBili  x   /  AST  85<H>  /  ALT  297<H>  /  AlkPhos  158<H>  08-02      Urinalysis Basic - ( 02 Aug 2023 20:08 )    Color: Yellow / Appearance: Clear / SG: >=1.030 / pH: x  Gluc: 99 mg/dL / Ketone: NEGATIVE  / Bili: Negative / Urobili: 0.2 E.U./dL   Blood: x / Protein: NEGATIVE mg/dL / Nitrite: NEGATIVE   Leuk Esterase: NEGATIVE / RBC: < 5 /HPF / WBC < 5 /HPF   Sq Epi: x / Non Sq Epi: x / Bacteria: Present /HPF      CAPILLARY BLOOD GLUCOSE        LIVER FUNCTIONS - ( 02 Aug 2023 20:08 )  Alb: 4.3 g/dL / Pro: 7.4 g/dL / ALK PHOS: 158 U/L / ALT: 297 U/L / AST: 85 U/L / GGT: x             RADIOLOGY & ADDITIONAL STUDIES:

## 2023-08-03 NOTE — CONSULT NOTE ADULT - SUBJECTIVE AND OBJECTIVE BOX
HPI: HPI:  The patient is a 62 year old female with a PMHx of gastritis diagnosed on EGD and no PSHx presented with RUQ abdominal pain that was sharp and constand, diagnosed with presumed cholecystitis previously now s/p cholecystectomy and appendectomy after f/u with Dr Carmona in the clinic. Post op patient had HTN urgency, which after administration of 10 mg of labetalol improved; likely due to post op changes. Pt denying SOB/ CP / Headache    ROS: all 12 systems reviewed and negative except for HPI     PMHx - gastritis  PSHx - none  Meds - none  Allergies - none  SoHx - none  FamHx - no history of cancer, gallbladder problems  CScope - never, EGD in Jan 2023 with non erosive gastritis, duodenitis, hiatal hernia      PAST MEDICAL & SURGICAL HISTORY:  Gastritis      No significant past surgical history          Allergies    No Known Allergies    Intolerances        MEDICATIONS  (STANDING):  heparin   Injectable 5000 Unit(s) SubCutaneous every 8 hours  lactated ringers. 1000 milliLiter(s) (100 mL/Hr) IV Continuous <Continuous>    MEDICATIONS  (PRN):  acetaminophen     Tablet .. 1000 milliGRAM(s) Oral every 6 hours PRN Mild Pain (1 - 3)  ondansetron Injectable 4 milliGRAM(s) IV Push every 6 hours PRN Nausea and/or Vomiting  oxyCODONE    IR 2.5 milliGRAM(s) Oral every 6 hours PRN Moderate Pain (4 - 6)  oxyCODONE    IR 5 milliGRAM(s) Oral every 6 hours PRN Severe Pain (7 - 10)      SOCIAL HISTORY:    FAMILY HISTORY:  No pertinent family history in first degree relatives          Vital Signs Last 24 Hrs  T(C): 36.6 (03 Aug 2023 12:28), Max: 36.8 (02 Aug 2023 18:55)  T(F): 97.8 (03 Aug 2023 12:28), Max: 98.3 (02 Aug 2023 18:55)  HR: 83 (03 Aug 2023 14:18) (65 - 89)  BP: 178/78 (03 Aug 2023 14:18) (111/69 - 199/82)  BP(mean): 112 (03 Aug 2023 14:18) (98 - 120)  RR: 15 (03 Aug 2023 14:18) (10 - 24)  SpO2: 98% (03 Aug 2023 14:18) (97% - 100%)    Parameters below as of 03 Aug 2023 14:18  Patient On (Oxygen Delivery Method): nasal cannula  O2 Flow (L/min): 4      I&O's Summary    02 Aug 2023 07:01  -  03 Aug 2023 07:00  --------------------------------------------------------  IN: 880 mL / OUT: 600 mL / NET: 280 mL    03 Aug 2023 07:01  -  03 Aug 2023 15:17  --------------------------------------------------------  IN: 300 mL / OUT: 300 mL / NET: 0 mL        LABS:                        13.5   5.84  )-----------( 285      ( 03 Aug 2023 05:30 )             42.1     08-03    140  |  104  |  9   ----------------------------<  92  3.9   |  28  |  0.54    Ca    8.6      03 Aug 2023 05:30  Phos  3.1     08-03  Mg     2.1     08-03    TPro  7.1  /  Alb  4.1  /  TBili  1.1  /  DBili  x   /  AST  80<H>  /  ALT  253<H>  /  AlkPhos  150<H>  08-03    LIVER FUNCTIONS - ( 03 Aug 2023 05:30 )  Alb: 4.1 g/dL / Pro: 7.1 g/dL / ALK PHOS: 150 U/L / ALT: 253 U/L / AST: 80 U/L / GGT: x           PT/INR - ( 03 Aug 2023 05:30 )   PT: 11.8 sec;   INR: 1.04          PTT - ( 03 Aug 2023 05:30 )  PTT:37.4 sec  Urinalysis Basic - ( 03 Aug 2023 05:30 )    Color: x / Appearance: x / SG: x / pH: x  Gluc: 92 mg/dL / Ketone: x  / Bili: x / Urobili: x   Blood: x / Protein: x / Nitrite: x   Leuk Esterase: x / RBC: x / WBC x   Sq Epi: x / Non Sq Epi: x / Bacteria: x      CAPILLARY BLOOD GLUCOSE          Cultures:      PHYSICAL EXAM:  General: NAD, resting comfortably  HEENT: NC/AT, EOMI, normal hearing, no oral lesions, no LAD, neck supple  Pulmonary: Normal resp effort, CTA-B  Cardiovascular: NSR, no murmurs  Abdominal: Soft, ND, , mild abdominal tenderness, incisions CDI no organomegaly  Groin: Soft, nontender, no ecchymosis/hematoma, no erythema, no edema.  Extremities: (+) DP/PT pulses. FROM, normal strength, no clubbing/cyanosis/erythema/edema  Neuro: A/O x 3, normal sensation, no focal deficits  Pulses: Palpable distal pulses    RADIOLOGY & ADDITIONAL STUDIES:      ASSESSMENT:      PLAN:

## 2023-08-04 ENCOUNTER — TRANSCRIPTION ENCOUNTER (OUTPATIENT)
Age: 63
End: 2023-08-04

## 2023-08-04 VITALS
TEMPERATURE: 99 F | HEART RATE: 85 BPM | RESPIRATION RATE: 18 BRPM | SYSTOLIC BLOOD PRESSURE: 120 MMHG | OXYGEN SATURATION: 96 % | DIASTOLIC BLOOD PRESSURE: 68 MMHG

## 2023-08-04 LAB
ALBUMIN SERPL ELPH-MCNC: 3.8 G/DL — SIGNIFICANT CHANGE UP (ref 3.3–5)
ALP SERPL-CCNC: 136 U/L — HIGH (ref 40–120)
ALT FLD-CCNC: 201 U/L — HIGH (ref 10–45)
ANION GAP SERPL CALC-SCNC: 7 MMOL/L — SIGNIFICANT CHANGE UP (ref 5–17)
AST SERPL-CCNC: 57 U/L — HIGH (ref 10–40)
BILIRUB SERPL-MCNC: 0.9 MG/DL — SIGNIFICANT CHANGE UP (ref 0.2–1.2)
BUN SERPL-MCNC: 6 MG/DL — LOW (ref 7–23)
CALCIUM SERPL-MCNC: 8.9 MG/DL — SIGNIFICANT CHANGE UP (ref 8.4–10.5)
CHLORIDE SERPL-SCNC: 102 MMOL/L — SIGNIFICANT CHANGE UP (ref 96–108)
CO2 SERPL-SCNC: 32 MMOL/L — HIGH (ref 22–31)
CREAT SERPL-MCNC: 0.62 MG/DL — SIGNIFICANT CHANGE UP (ref 0.5–1.3)
EGFR: 102 ML/MIN/1.73M2 — SIGNIFICANT CHANGE UP
GLUCOSE SERPL-MCNC: 105 MG/DL — HIGH (ref 70–99)
HCT VFR BLD CALC: 38.9 % — SIGNIFICANT CHANGE UP (ref 34.5–45)
HGB BLD-MCNC: 13 G/DL — SIGNIFICANT CHANGE UP (ref 11.5–15.5)
MAGNESIUM SERPL-MCNC: 2 MG/DL — SIGNIFICANT CHANGE UP (ref 1.6–2.6)
MCHC RBC-ENTMCNC: 32 PG — SIGNIFICANT CHANGE UP (ref 27–34)
MCHC RBC-ENTMCNC: 33.4 GM/DL — SIGNIFICANT CHANGE UP (ref 32–36)
MCV RBC AUTO: 95.8 FL — SIGNIFICANT CHANGE UP (ref 80–100)
NRBC # BLD: 0 /100 WBCS — SIGNIFICANT CHANGE UP (ref 0–0)
PHOSPHATE SERPL-MCNC: 3.4 MG/DL — SIGNIFICANT CHANGE UP (ref 2.5–4.5)
PLATELET # BLD AUTO: 285 K/UL — SIGNIFICANT CHANGE UP (ref 150–400)
POTASSIUM SERPL-MCNC: 3.8 MMOL/L — SIGNIFICANT CHANGE UP (ref 3.5–5.3)
POTASSIUM SERPL-SCNC: 3.8 MMOL/L — SIGNIFICANT CHANGE UP (ref 3.5–5.3)
PROT SERPL-MCNC: 6.8 G/DL — SIGNIFICANT CHANGE UP (ref 6–8.3)
RBC # BLD: 4.06 M/UL — SIGNIFICANT CHANGE UP (ref 3.8–5.2)
RBC # FLD: 12.2 % — SIGNIFICANT CHANGE UP (ref 10.3–14.5)
SODIUM SERPL-SCNC: 141 MMOL/L — SIGNIFICANT CHANGE UP (ref 135–145)
WBC # BLD: 8.23 K/UL — SIGNIFICANT CHANGE UP (ref 3.8–10.5)
WBC # FLD AUTO: 8.23 K/UL — SIGNIFICANT CHANGE UP (ref 3.8–10.5)

## 2023-08-04 PROCEDURE — 85025 COMPLETE CBC W/AUTO DIFF WBC: CPT

## 2023-08-04 PROCEDURE — C1889: CPT

## 2023-08-04 PROCEDURE — 99285 EMERGENCY DEPT VISIT HI MDM: CPT | Mod: 25

## 2023-08-04 PROCEDURE — 80053 COMPREHEN METABOLIC PANEL: CPT

## 2023-08-04 PROCEDURE — 81001 URINALYSIS AUTO W/SCOPE: CPT

## 2023-08-04 PROCEDURE — 85730 THROMBOPLASTIN TIME PARTIAL: CPT

## 2023-08-04 PROCEDURE — 76705 ECHO EXAM OF ABDOMEN: CPT

## 2023-08-04 PROCEDURE — 96374 THER/PROPH/DIAG INJ IV PUSH: CPT

## 2023-08-04 PROCEDURE — C9399: CPT

## 2023-08-04 PROCEDURE — C1758: CPT

## 2023-08-04 PROCEDURE — 88304 TISSUE EXAM BY PATHOLOGIST: CPT

## 2023-08-04 PROCEDURE — 85027 COMPLETE CBC AUTOMATED: CPT

## 2023-08-04 PROCEDURE — 86803 HEPATITIS C AB TEST: CPT

## 2023-08-04 PROCEDURE — 85610 PROTHROMBIN TIME: CPT

## 2023-08-04 PROCEDURE — 36415 COLL VENOUS BLD VENIPUNCTURE: CPT

## 2023-08-04 PROCEDURE — 84100 ASSAY OF PHOSPHORUS: CPT

## 2023-08-04 PROCEDURE — 76000 FLUOROSCOPY <1 HR PHYS/QHP: CPT

## 2023-08-04 PROCEDURE — 83690 ASSAY OF LIPASE: CPT

## 2023-08-04 PROCEDURE — 83735 ASSAY OF MAGNESIUM: CPT

## 2023-08-04 PROCEDURE — 74176 CT ABD & PELVIS W/O CONTRAST: CPT | Mod: MA

## 2023-08-04 RX ORDER — OXYCODONE HYDROCHLORIDE 5 MG/1
1 TABLET ORAL
Qty: 12 | Refills: 0
Start: 2023-08-04 | End: 2023-08-06

## 2023-08-04 RX ORDER — POTASSIUM CHLORIDE 20 MEQ
20 PACKET (EA) ORAL ONCE
Refills: 0 | Status: COMPLETED | OUTPATIENT
Start: 2023-08-04 | End: 2023-08-04

## 2023-08-04 RX ORDER — DOCUSATE SODIUM 100 MG
1 CAPSULE ORAL
Qty: 1 | Refills: 0
Start: 2023-08-04 | End: 2023-08-04

## 2023-08-04 RX ADMIN — Medication 1000 MILLIGRAM(S): at 09:47

## 2023-08-04 RX ADMIN — Medication 20 MILLIEQUIVALENT(S): at 08:39

## 2023-08-04 RX ADMIN — HEPARIN SODIUM 5000 UNIT(S): 5000 INJECTION INTRAVENOUS; SUBCUTANEOUS at 03:07

## 2023-08-04 RX ADMIN — HEPARIN SODIUM 5000 UNIT(S): 5000 INJECTION INTRAVENOUS; SUBCUTANEOUS at 11:45

## 2023-08-04 RX ADMIN — Medication 1000 MILLIGRAM(S): at 10:20

## 2023-08-04 NOTE — CONSULT NOTE ADULT - ASSESSMENT
63 yo female with a PMHx of gastritis diagnosed on EGD and no PSHx presenting with upper abdominal pain, primarily in RUQ abdominal pain associated with some nausea, found to have Labs with Tbili 1.0, , AST 85  and a RUQ US consistent with cholelithiasis. Patient is now POD#1 s/p laparoscopic cholecystectomy, IOC, appendectomy. Continue to advance diet as tolerated. Continue with adequate pain/nausea management. Please restart home meds prior to discharge. DVT prophylaxis as per primary team. Continue to monitor CBC and BMP and replete electrolytes accordingly.    Thank you for this consultation and please feel free to contact us with any questions.
The patient is a 62 year old female with a PMHx of gastritis diagnosed on EGD and no PSHx presented with RUQ abdominal pain that was sharp and constand, diagnosed with presumed cholecystitis previously now s/p cholecystectomy and appendectomy after f/u with Dr Carmona in the clinic. Post op patient had HTN urgency, which after administration of 10 mg of labetalol improved; likely due to post op changes. continue PRN labetalol 10 MG Q 6 Hours, if HTN persists can add on PO PRN hydralazine 25 mg TID; Pain management to continue to help with BP control; transaminitis / cholelithiasis s/p appy and Cholecystectomy

## 2023-08-04 NOTE — DISCHARGE NOTE NURSING/CASE MANAGEMENT/SOCIAL WORK - PATIENT PORTAL LINK FT
You can access the FollowMyHealth Patient Portal offered by [facility] by registering at the following website: [email or web address]. By joining [employer]’s FollowMyHealth portal, you will also be able to view your health information using other applications (apps) compatible with our system.

## 2023-08-04 NOTE — PROGRESS NOTE ADULT - SUBJECTIVE AND OBJECTIVE BOX
Overnight events: No acute overnight events.       POD#1 laparoscopic cholecystectomy, IOC, w/ appendectomy     SUBJECTIVE: Patient seen at bedside with chief resident. Patient reports improvement in pain. She denies any nausea or vomiting.       MEDICATIONS  (STANDING):  heparin   Injectable 5000 Unit(s) SubCutaneous every 8 hours  pantoprazole   Suspension 40 milliGRAM(s) Oral before dinner    MEDICATIONS  (PRN):  acetaminophen     Tablet .. 1000 milliGRAM(s) Oral every 6 hours PRN Mild Pain (1 - 3)  ondansetron Injectable 4 milliGRAM(s) IV Push every 6 hours PRN Nausea and/or Vomiting  oxyCODONE    IR 2.5 milliGRAM(s) Oral every 6 hours PRN Moderate Pain (4 - 6)  oxyCODONE    IR 5 milliGRAM(s) Oral every 6 hours PRN Severe Pain (7 - 10)      Vital Signs Last 24 Hrs  T(C): 36.9 (04 Aug 2023 05:02), Max: 36.9 (04 Aug 2023 05:02)  T(F): 98.5 (04 Aug 2023 05:02), Max: 98.5 (04 Aug 2023 05:02)  HR: 68 (04 Aug 2023 05:02) (65 - 89)  BP: 120/65 (04 Aug 2023 05:02) (111/69 - 199/82)  BP(mean): 101 (03 Aug 2023 15:18) (97 - 120)  RR: 18 (04 Aug 2023 05:02) (10 - 24)  SpO2: 95% (04 Aug 2023 05:02) (95% - 100%)    Parameters below as of 04 Aug 2023 05:02  Patient On (Oxygen Delivery Method): room air        Physical Exam:  General: NAD, resting comfortably in bed  Pulmonary: Nonlabored breathing, no respiratory distress  Cardiovascular: NSR  Abdominal: soft, ND, Appropriate incisional tenderness   Extremities: WWP, normal strength  Neuro: A/O x 3, CNs II-XII grossly intact, no focal deficits, normal motor/sensation  Pulses: palpable distal pulses    I&O's Summary    03 Aug 2023 07:01  -  04 Aug 2023 07:00  --------------------------------------------------------  IN: 1020 mL / OUT: 2250 mL / NET: -1230 mL        LABS:                        13.0   8.23  )-----------( 285      ( 04 Aug 2023 06:14 )             38.9     08-04    141  |  102  |  6<L>  ----------------------------<  105<H>  3.8   |  32<H>  |  0.62    Ca    8.9      04 Aug 2023 06:14  Phos  3.4     08-04  Mg     2.0     08-04    TPro  6.8  /  Alb  3.8  /  TBili  0.9  /  DBili  x   /  AST  57<H>  /  ALT  201<H>  /  AlkPhos  136<H>  08-04    PT/INR - ( 03 Aug 2023 05:30 )   PT: 11.8 sec;   INR: 1.04          PTT - ( 03 Aug 2023 05:30 )  PTT:37.4 sec  Urinalysis Basic - ( 04 Aug 2023 06:14 )    Color: x / Appearance: x / SG: x / pH: x  Gluc: 105 mg/dL / Ketone: x  / Bili: x / Urobili: x   Blood: x / Protein: x / Nitrite: x   Leuk Esterase: x / RBC: x / WBC x   Sq Epi: x / Non Sq Epi: x / Bacteria: x      CAPILLARY BLOOD GLUCOSE        LIVER FUNCTIONS - ( 04 Aug 2023 06:14 )  Alb: 3.8 g/dL / Pro: 6.8 g/dL / ALK PHOS: 136 U/L / ALT: 201 U/L / AST: 57 U/L / GGT: x             RADIOLOGY & ADDITIONAL STUDIES:

## 2023-08-04 NOTE — CONSULT NOTE ADULT - SUBJECTIVE AND OBJECTIVE BOX
63 yo female with a PMHx of gastritis diagnosed on EGD and no PSHx presenting with upper abdominal pain, primarily in RUQ abdominal pain associated with some nausea, found to have Labs with Tbili 1.0, , AST 85  and a RUQ US consistent with cholelithiasis. Patient is now POD#1 s/p laparoscopic cholecystectomy, IOC, appendectomy. Patient states she has mild, diffuse abdominal pain but is manageable. She was wondering when she could go back to work. ROS as below.    PAST MEDICAL/SURGICAL HISTORY  PAST MEDICAL & SURGICAL HISTORY:  Gastritis  No significant past surgical history    FAMILY HISTORY  FAMILY HISTORY:  No pertinent family history in first degree relatives    MEDICATIONS  (STANDING):  heparin   Injectable 5000 Unit(s) SubCutaneous every 8 hours  pantoprazole   Suspension 40 milliGRAM(s) Oral before dinner    MEDICATIONS  (PRN):  acetaminophen     Tablet .. 1000 milliGRAM(s) Oral every 6 hours PRN Mild Pain (1 - 3)  ondansetron Injectable 4 milliGRAM(s) IV Push every 6 hours PRN Nausea and/or Vomiting  oxyCODONE    IR 2.5 milliGRAM(s) Oral every 6 hours PRN Moderate Pain (4 - 6)  oxyCODONE    IR 5 milliGRAM(s) Oral every 6 hours PRN Severe Pain (7 - 10)      REVIEW OF SYSTEMS:  CONSTITUTIONAL: No fever, weight loss, or fatigue  RESPIRATORY: No shortness of breath  CARDIOVASCULAR: No chest pain  GASTROINTESTINAL: No abdominal or epigastric pain. No nausea, vomiting, or hematemesis; No diarrhea or constipation.   MUSCULOSKELETAL: No joint pain or swelling    T(C): 37 (08-04-23 @ 12:57), Max: 37 (08-04-23 @ 12:57)  HR: 85 (08-04-23 @ 12:57) (65 - 89)  BP: 120/68 (08-04-23 @ 12:57) (116/66 - 178/78)  RR: 18 (08-04-23 @ 12:57) (10 - 24)  SpO2: 96% (08-04-23 @ 12:57) (95% - 100%)  Wt(kg): --Vital Signs Last 24 Hrs  T(C): 37 (04 Aug 2023 12:57), Max: 37 (04 Aug 2023 12:57)  T(F): 98.6 (04 Aug 2023 12:57), Max: 98.6 (04 Aug 2023 12:57)  HR: 85 (04 Aug 2023 12:57) (65 - 89)  BP: 120/68 (04 Aug 2023 12:57) (116/66 - 178/78)  BP(mean): 101 (03 Aug 2023 15:18) (97 - 119)  RR: 18 (04 Aug 2023 12:57) (10 - 24)  SpO2: 96% (04 Aug 2023 12:57) (95% - 100%)    Parameters below as of 04 Aug 2023 12:57  Patient On (Oxygen Delivery Method): room air      Oxygen Saturation Index= Unable to calculate   [Based on FiO2 = Unknown, SpO2 = 96(08/04/2023 12:57), MAP = Unknown]  Daily     Daily     PHYSICAL EXAM:  GENERAL: NAD  CHEST/LUNG: Clear to percussion bilaterally; No rales, rhonchi, wheezing, or rubs  HEART: Regular rate and rhythm; No murmurs, rubs, or gallops  ABDOMEN: Soft, mild tenderness at incision site, Nondistended; Bowel sounds present; incisions intact and clean  EXTREMITIES:  2+ Peripheral Pulses, No clubbing, cyanosis, or edema      CAPILLARY BLOOD GLUCOSE    LABS:  CBC   08-04-23 @ 06:14  Hematcorit 38.9  Hemoglobin 13.0  Mean Cell Hemoglobin 32.0  Platelet Count-Automated 285  RBC Count 4.06  Red Cell Distrib Width 12.2  Wbc Count 8.23    BMP  08-04-23 @ 06:14  Anion Gap. Serum 7  Blood Urea Nitrogen,Serm 6  Calcium, Total Serum 8.9  Carbon Dioxide, Serum 32  Chloride, Serum 102  Creatinine, Serum 0.62  eGFR in  --  eGFR in Non Afican American --  Gloucose, serum 105  Potassium, Serum 3.8  Sodium, Serum 141    08-03-23 @ 05:30  Anion Gap. Serum 8  Blood Urea Nitrogen,Serm 9  Calcium, Total Serum 8.6  Carbon Dioxide, Serum 28  Chloride, Serum 104  Creatinine, Serum 0.54  eGFR in  --  eGFR in Non Afican American --  Gloucose, serum 92  Potassium, Serum 3.9  Sodium, Serum 140    08-02-23 @ 20:08  Anion Gap. Serum 8  Blood Urea Nitrogen,Serm 14  Calcium, Total Serum 9.2  Carbon Dioxide, Serum 29  Chloride, Serum 101  Creatinine, Serum 0.67  eGFR in  --  eGFR in Non Afican American --  Gloucose, serum 99  Potassium, Serum 3.9  Sodium, Serum 138    CMP  08-04-23 @ 06:14  Divina Aminotransferase(ALT/SGPT)201  Albumin, Serum 3.8  Alkaline Phosphatase, Serum 136  Anion Gap, Serum 7  Aspartate Aminotransferase (AST/SGOT)57  Bilirubin Total, Serum 0.9  Blood Urea Nitrogen, Serum 6  Calcium,Total Serum 8.9  Carbon Dioxide, Serum 32  Chloride, Serum 102  Creatinine, Serum 0.62  eGFR if  --  eGFR if Non African American --  Glucose, Serum 105  Potassium, Serum 3.8  Protein Total, Serum 6.8  Sodium, Serum 141    08-03-23 @ 05:30  Divina Aminotransferase(ALT/SGPT)253  Albumin, Serum 4.1  Alkaline Phosphatase, Serum 150  Anion Gap, Serum 8  Aspartate Aminotransferase (AST/SGOT)80  Bilirubin Total, Serum 1.1  Blood Urea Nitrogen, Serum 9  Calcium,Total Serum 8.6  Carbon Dioxide, Serum 28  Chloride, Serum 104  Creatinine, Serum 0.54  eGFR if  --  eGFR if Non African American --  Glucose, Serum 92  Potassium, Serum 3.9  Protein Total, Serum 7.1  Sodium, Serum 140    08-02-23 @ 20:08  Divina Aminotransferase(ALT/SGPT)297  Albumin, Serum 4.3  Alkaline Phosphatase, Serum 158  Anion Gap, Serum 8  Aspartate Aminotransferase (AST/SGOT)85  Bilirubin Total, Serum 1.0  Blood Urea Nitrogen, Serum 14  Calcium,Total Serum 9.2  Carbon Dioxide, Serum 29  Chloride, Serum 101  Creatinine, Serum 0.67  eGFR if  --  eGFR if Non African American --  Glucose, Serum 99  Potassium, Serum 3.9  Protein Total, Serum 7.4  Sodium, Serum 138    PT/INR  PT/INR  08-03-23 @ 05:30  INR 1.04  Prothrombin Time Comment --  Prothrobin Time, [id number]    Amylase/Lipase  08-02-23 @ 20:08  Amylase, Serum Total --  Lipase, Serum 27

## 2023-08-04 NOTE — PROGRESS NOTE ADULT - ASSESSMENT
62 year old female with a PMHx of gastritis, no PSHx presenting with 4 days of RUQ abdominal pain associated with nausea and left flank pain, labs with normal WBC and elevated LFTs admitted for suspected cholelithiasis. Now s/p laparoscopic cholecystectomy, IOC, w/ appendectomy (8/3).    Regional  LFD  Pain/nausea control prn, patient was given .5 Dilaudid and Tylenol IV for improved pain control with high SBP, with response  SQH (in 8 hours), SCDs  No abx  OOB/IS  AM Labs  
62 year old female with a PMHx of gastritis, no PSHx presenting with 4 days of RUQ abdominal pain associated with nausea and left flank pain, labs with normal WBC and elevated LFTs admitted for suspected cholelithiasis. Now s/p laparoscopic cholecystectomy, IOC, w/ appendectomy (8/3).    Regional  LFD  Pain/nausea control prn  SQH (in 8 hours), SCDs  No abx  OOB/IS  AM Labs  
The patient is a 62 year old female with a PMHx of gastritis, no PSHx presenting with 4 days of RUQ abdominal pain associated with nausea and left flank pain, labs with normal WBC and elevated LFTs admitted for suspected cholelithiasis.     CTAP with NO Contrast - Renal Stone Valerio  NPO/IVF  Pain/nausea control prn  No Abx  SQH/SCDs/OOBA/IS  AM Labs  Preop for OR today. lap elroy

## 2023-08-04 NOTE — DISCHARGE NOTE NURSING/CASE MANAGEMENT/SOCIAL WORK - NSDCPEFALRISK_GEN_ALL_CORE
For information on Fall & Injury Prevention, visit: [email or web address] OR  [email or web address] OR  https://www.cdc.gov/steadi/patient.html

## 2023-08-08 ENCOUNTER — TRANSCRIPTION ENCOUNTER (OUTPATIENT)
Age: 63
End: 2023-08-08

## 2023-08-11 LAB — SURGICAL PATHOLOGY STUDY: SIGNIFICANT CHANGE UP

## 2023-08-16 DIAGNOSIS — R74.01 ELEVATION OF LEVELS OF LIVER TRANSAMINASE LEVELS: ICD-10-CM

## 2023-08-16 DIAGNOSIS — K80.00 CALCULUS OF GALLBLADDER WITH ACUTE CHOLECYSTITIS WITHOUT OBSTRUCTION: ICD-10-CM

## 2023-08-16 DIAGNOSIS — K38.9 DISEASE OF APPENDIX, UNSPECIFIED: ICD-10-CM
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
## 2023-08-16 DIAGNOSIS — I97.88 OTHER INTRAOPERATIVE COMPLICATIONS OF THE CIRCULATORY SYSTEM, NOT ELSEWHERE CLASSIFIED: ICD-10-CM
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
## 2023-08-16 DIAGNOSIS — Z20.822 CONTACT WITH AND (SUSPECTED) EXPOSURE TO COVID-19: ICD-10-CM

## 2023-08-16 DIAGNOSIS — Z87.19 PERSONAL HISTORY OF OTHER DISEASES OF THE DIGESTIVE SYSTEM: ICD-10-CM

## 2023-08-16 DIAGNOSIS — R10.11 RIGHT UPPER QUADRANT PAIN: ICD-10-CM

## 2023-08-16 DIAGNOSIS — I97.3 POSTPROCEDURAL HYPERTENSION: ICD-10-CM

## 2023-08-16 DIAGNOSIS — I16.0 HYPERTENSIVE URGENCY: ICD-10-CM

## (undated) DEVICE — LIGASURE MARYLAND 37CM

## (undated) DEVICE — ENDOCATCH 10MM SPECIMEN POUCH

## (undated) DEVICE — POSITIONER FOAM EGG CRATE ULNAR 2PCS (PINK)

## (undated) DEVICE — WARMING BLANKET UPPER ADULT

## (undated) DEVICE — DRSG STERISTRIPS 0.5 X 4"

## (undated) DEVICE — D HELP - CLEARVIEW CLEARIFY SYSTEM

## (undated) DEVICE — TUBING STRYKER PNEUMOSURE HI FLOW INSUFFLATOR

## (undated) DEVICE — STAPLER COVIDIEN ENDO GIA STANDARD HANDLE

## (undated) DEVICE — DRSG DERMABOND 0.7ML

## (undated) DEVICE — TROCAR COVIDIEN VERSAPORT BLADELESS OPTICAL 5MM STANDARD

## (undated) DEVICE — TROCAR COVIDIEN VERSAPORT BLADELESS OPTICAL 12MM STANDARD

## (undated) DEVICE — PACK GENERAL LAPAROSCOPY

## (undated) DEVICE — SUT VICRYL 0 27" UR-6

## (undated) DEVICE — FORCEP RADIAL JAW 4 W NDL 2.2MM 2.8MM 240CM ORANGE DISP

## (undated) DEVICE — ELCTR STRYKER NEPTUNE SMOKE EVACUATION PENCIL (GREEN)

## (undated) DEVICE — TIP METZENBAUM SCISSOR MONOPOLAR ENDOCUT (ORANGE)

## (undated) DEVICE — Device

## (undated) DEVICE — MARKING PEN W RULER

## (undated) DEVICE — VENODYNE/SCD SLEEVE CALF MEDIUM

## (undated) DEVICE — SOL IRR BAG NS 0.9% 3000ML

## (undated) DEVICE — TROCAR COVIDIEN VERSAONE BLUNT TIP HASSAN 12MM

## (undated) DEVICE — SUT MONOCRYL 4-0 18" PS-2

## (undated) DEVICE — TROCAR ETHICON ENDOPATH XCEL UNIVERSAL SLEEVE WITH OPTIVIEW 5MM X 100MM

## (undated) DEVICE — SPONGE ENDO PEANUT 5MM